# Patient Record
Sex: MALE | Race: WHITE | Employment: FULL TIME | URBAN - NONMETROPOLITAN AREA
[De-identification: names, ages, dates, MRNs, and addresses within clinical notes are randomized per-mention and may not be internally consistent; named-entity substitution may affect disease eponyms.]

---

## 2014-12-06 LAB
AVERAGE GLUCOSE: NORMAL
HBA1C MFR BLD: 5 %

## 2016-09-15 LAB
CHOLESTEROL, TOTAL: 284 MG/DL
CHOLESTEROL/HDL RATIO: 5.2
HDLC SERPL-MCNC: 55 MG/DL (ref 35–70)
LDL CHOLESTEROL CALCULATED: 156.2 MG/DL (ref 0–160)
TRIGL SERPL-MCNC: 364 MG/DL
VLDLC SERPL CALC-MCNC: 73 MG/DL

## 2017-02-20 ENCOUNTER — OFFICE VISIT (OUTPATIENT)
Dept: PRIMARY CARE CLINIC | Age: 47
End: 2017-02-20

## 2017-02-20 VITALS
WEIGHT: 195 LBS | HEART RATE: 71 BPM | DIASTOLIC BLOOD PRESSURE: 80 MMHG | TEMPERATURE: 96.2 F | RESPIRATION RATE: 16 BRPM | HEIGHT: 73 IN | SYSTOLIC BLOOD PRESSURE: 142 MMHG | OXYGEN SATURATION: 98 % | BODY MASS INDEX: 25.84 KG/M2

## 2017-02-20 DIAGNOSIS — M26.623 BILATERAL TEMPOROMANDIBULAR JOINT PAIN: Primary | ICD-10-CM

## 2017-02-20 PROCEDURE — 99213 OFFICE O/P EST LOW 20 MIN: CPT | Performed by: FAMILY MEDICINE

## 2017-02-20 ASSESSMENT — ENCOUNTER SYMPTOMS
GASTROINTESTINAL NEGATIVE: 1
RESPIRATORY NEGATIVE: 1
EYES NEGATIVE: 1
ALLERGIC/IMMUNOLOGIC NEGATIVE: 1

## 2017-09-20 LAB
CHOLESTEROL/HDL RATIO: 5.1 RATIO
CHOLESTEROL: 307 MG/DL
HDL, DIRECT: 60 MG/DL
LDL CHOLESTEROL CALCULATED: 188.4 MG/DL
SCREENING PSA: 0.82 NG/ML
TRIGL SERPL-MCNC: 293 MG/DL
VLDLC SERPL CALC-MCNC: 59 MG/DL

## 2017-09-21 ENCOUNTER — OFFICE VISIT (OUTPATIENT)
Dept: FAMILY MEDICINE CLINIC | Age: 47
End: 2017-09-21
Payer: COMMERCIAL

## 2017-09-21 VITALS
HEIGHT: 73 IN | DIASTOLIC BLOOD PRESSURE: 86 MMHG | BODY MASS INDEX: 25.45 KG/M2 | HEART RATE: 80 BPM | SYSTOLIC BLOOD PRESSURE: 120 MMHG | WEIGHT: 192 LBS

## 2017-09-21 VITALS
SYSTOLIC BLOOD PRESSURE: 122 MMHG | WEIGHT: 196 LBS | HEIGHT: 72 IN | BODY MASS INDEX: 26.55 KG/M2 | HEART RATE: 64 BPM | DIASTOLIC BLOOD PRESSURE: 84 MMHG

## 2017-09-21 DIAGNOSIS — Z80.42 FAMILY HISTORY OF PROSTATE CANCER IN FATHER: ICD-10-CM

## 2017-09-21 DIAGNOSIS — Z80.42 FAMILY HISTORY OF MALIGNANT NEOPLASM OF PROSTATE: ICD-10-CM

## 2017-09-21 DIAGNOSIS — E78.2 MIXED HYPERLIPIDEMIA: ICD-10-CM

## 2017-09-21 DIAGNOSIS — R12 HEARTBURN: ICD-10-CM

## 2017-09-21 DIAGNOSIS — Z00.00 EXAMINATION, MEDICAL, GENERAL: Primary | ICD-10-CM

## 2017-09-21 DIAGNOSIS — K21.9 GASTROESOPHAGEAL REFLUX DISEASE WITHOUT ESOPHAGITIS: ICD-10-CM

## 2017-09-21 PROBLEM — E78.5 HYPERLIPIDEMIA: Status: ACTIVE | Noted: 2017-09-21

## 2017-09-21 PROCEDURE — 99396 PREV VISIT EST AGE 40-64: CPT | Performed by: FAMILY MEDICINE

## 2017-09-21 RX ORDER — PREDNISONE 20 MG/1
20 TABLET ORAL DAILY
COMMUNITY
End: 2017-09-21 | Stop reason: ALTCHOICE

## 2017-09-21 RX ORDER — ATORVASTATIN CALCIUM 40 MG/1
40 TABLET, FILM COATED ORAL DAILY
Qty: 90 TABLET | Refills: 3 | Status: SHIPPED | OUTPATIENT
Start: 2017-09-21 | End: 2018-09-26 | Stop reason: SDUPTHER

## 2017-09-21 RX ORDER — ATORVASTATIN CALCIUM 40 MG/1
40 TABLET, FILM COATED ORAL DAILY
COMMUNITY
End: 2017-09-21 | Stop reason: SDUPTHER

## 2017-09-21 RX ORDER — IBUPROFEN 200 MG
200 TABLET ORAL EVERY 6 HOURS PRN
COMMUNITY
End: 2020-01-17 | Stop reason: ALTCHOICE

## 2017-09-21 ASSESSMENT — PATIENT HEALTH QUESTIONNAIRE - PHQ9
1. LITTLE INTEREST OR PLEASURE IN DOING THINGS: 0
SUM OF ALL RESPONSES TO PHQ QUESTIONS 1-9: 0
SUM OF ALL RESPONSES TO PHQ9 QUESTIONS 1 & 2: 0
2. FEELING DOWN, DEPRESSED OR HOPELESS: 0

## 2017-09-23 PROBLEM — K21.9 GASTROESOPHAGEAL REFLUX DISEASE WITHOUT ESOPHAGITIS: Status: ACTIVE | Noted: 2017-09-21

## 2017-09-23 ASSESSMENT — ENCOUNTER SYMPTOMS
SHORTNESS OF BREATH: 0
ABDOMINAL DISTENTION: 0
WHEEZING: 0
SORE THROAT: 0
ABDOMINAL PAIN: 0
CHEST TIGHTNESS: 0

## 2017-11-21 DIAGNOSIS — E78.2 MIXED HYPERLIPIDEMIA: ICD-10-CM

## 2017-11-21 LAB
A/G RATIO: 1.5 RATIO
ALBUMIN: 4.1 G/DL
ALK PHOSPHATASE: 91 UNITS/L
ALT SERPL-CCNC: 96 UNITS/L
AST SERPL-CCNC: 72 UNITS/L
BILIRUB SERPL-MCNC: 0.7 MG/DL
BILIRUBIN DIRECT: 0 MG/DL
CHOLESTEROL, TOTAL: 199 MG/DL
CHOLESTEROL/HDL RATIO: 4.1
CHOLESTEROL/HDL RATIO: 4.1 RATIO
CHOLESTEROL: 199 MG/DL
GLOBULIN: 2.8 G/DL
HDL, DIRECT: 49 MG/DL
HDLC SERPL-MCNC: 49 MG/DL (ref 35–70)
LDL CHOLESTEROL CALCULATED: 58.2 MG/DL
LDL CHOLESTEROL CALCULATED: 58.2 MG/DL (ref 0–160)
TOTAL PROTEIN: 6.9 G/DL
TRIGL SERPL-MCNC: 459 MG/DL
TRIGL SERPL-MCNC: 459 MG/DL
VLDLC SERPL CALC-MCNC: 92 MG/DL
VLDLC SERPL CALC-MCNC: 92 MG/DL

## 2017-11-27 DIAGNOSIS — E78.2 MIXED HYPERLIPIDEMIA: Primary | ICD-10-CM

## 2017-12-04 DIAGNOSIS — R74.01 ELEVATED TRANSAMINASE LEVEL: Primary | ICD-10-CM

## 2017-12-19 LAB
HAV IGM SER IA-ACNC: NORMAL
HEPATITIS B CORE IGM ANTIBODY: NORMAL
HEPATITIS B SURFACE ANTIGEN: NORMAL
HEPATITIS C IGG: NORMAL
Lab: NORMAL
SIGNAL/CUTOFF: NORMAL

## 2018-01-19 ENCOUNTER — TELEPHONE (OUTPATIENT)
Dept: FAMILY MEDICINE CLINIC | Age: 48
End: 2018-01-19

## 2018-06-27 ENCOUNTER — HOSPITAL ENCOUNTER (OUTPATIENT)
Dept: GENERAL RADIOLOGY | Age: 48
Discharge: HOME | End: 2018-06-29
Payer: COMMERCIAL

## 2018-06-27 ENCOUNTER — HOSPITAL ENCOUNTER (OUTPATIENT)
Age: 48
Discharge: HOME | End: 2018-06-29
Payer: COMMERCIAL

## 2018-06-27 ENCOUNTER — OFFICE VISIT (OUTPATIENT)
Dept: PRIMARY CARE CLINIC | Age: 48
End: 2018-06-27
Payer: COMMERCIAL

## 2018-06-27 VITALS
TEMPERATURE: 97.8 F | WEIGHT: 194 LBS | OXYGEN SATURATION: 98 % | SYSTOLIC BLOOD PRESSURE: 150 MMHG | RESPIRATION RATE: 16 BRPM | BODY MASS INDEX: 25.71 KG/M2 | HEIGHT: 73 IN | DIASTOLIC BLOOD PRESSURE: 90 MMHG | HEART RATE: 70 BPM

## 2018-06-27 DIAGNOSIS — M47.816 SPONDYLOSIS OF LUMBAR SPINE: ICD-10-CM

## 2018-06-27 DIAGNOSIS — M54.50 ACUTE LEFT-SIDED LOW BACK PAIN WITHOUT SCIATICA: ICD-10-CM

## 2018-06-27 DIAGNOSIS — M43.10 RETROLISTHESIS OF VERTEBRAE: Primary | ICD-10-CM

## 2018-06-27 PROCEDURE — 72100 X-RAY EXAM L-S SPINE 2/3 VWS: CPT

## 2018-06-27 PROCEDURE — 99213 OFFICE O/P EST LOW 20 MIN: CPT | Performed by: NURSE PRACTITIONER

## 2018-06-27 RX ORDER — PREDNISONE 20 MG/1
20 TABLET ORAL DAILY
Qty: 5 TABLET | Refills: 0 | Status: SHIPPED | OUTPATIENT
Start: 2018-06-27 | End: 2018-07-02

## 2018-06-27 RX ORDER — HYDROCODONE BITARTRATE AND ACETAMINOPHEN 5; 325 MG/1; MG/1
1 TABLET ORAL EVERY 6 HOURS PRN
Qty: 28 TABLET | Refills: 0 | Status: SHIPPED | OUTPATIENT
Start: 2018-06-27 | End: 2018-07-04

## 2018-06-27 ASSESSMENT — PATIENT HEALTH QUESTIONNAIRE - PHQ9
2. FEELING DOWN, DEPRESSED OR HOPELESS: 0
SUM OF ALL RESPONSES TO PHQ9 QUESTIONS 1 & 2: 0
1. LITTLE INTEREST OR PLEASURE IN DOING THINGS: 0
SUM OF ALL RESPONSES TO PHQ QUESTIONS 1-9: 0

## 2018-06-27 ASSESSMENT — ENCOUNTER SYMPTOMS
BACK PAIN: 1
RESPIRATORY NEGATIVE: 1
BOWEL INCONTINENCE: 0

## 2018-09-21 LAB
A/G RATIO: 1.5 RATIO
ALBUMIN: 4.3 G/DL
ALK PHOSPHATASE: 86 UNITS/L
ALT SERPL-CCNC: 61 UNITS/L
AST SERPL-CCNC: 37 UNITS/L
BILIRUB SERPL-MCNC: 0.7 MG/DL
BILIRUBIN DIRECT: 0 MG/DL
CHOLESTEROL/HDL RATIO: 4.5 RATIO
CHOLESTEROL: 229 MG/DL
GLOBULIN: 2.8 G/DL
HDL, DIRECT: 51 MG/DL
LDL CHOLESTEROL CALCULATED: 45.8 MG/DL
SCREENING PSA: 0.74 NG/ML
TOTAL PROTEIN: 7.1 G/DL
TRIGL SERPL-MCNC: 661 MG/DL
VLDLC SERPL CALC-MCNC: 132 MG/DL

## 2018-09-26 ENCOUNTER — OFFICE VISIT (OUTPATIENT)
Dept: FAMILY MEDICINE CLINIC | Age: 48
End: 2018-09-26
Payer: COMMERCIAL

## 2018-09-26 VITALS
DIASTOLIC BLOOD PRESSURE: 70 MMHG | HEIGHT: 73 IN | WEIGHT: 201 LBS | BODY MASS INDEX: 26.64 KG/M2 | SYSTOLIC BLOOD PRESSURE: 134 MMHG

## 2018-09-26 DIAGNOSIS — H10.9 CONJUNCTIVITIS OF LEFT EYE, UNSPECIFIED CONJUNCTIVITIS TYPE: ICD-10-CM

## 2018-09-26 DIAGNOSIS — Z80.42 FAMILY HISTORY OF MALIGNANT NEOPLASM OF PROSTATE: ICD-10-CM

## 2018-09-26 DIAGNOSIS — E78.1 HYPERTRIGLYCERIDEMIA: ICD-10-CM

## 2018-09-26 DIAGNOSIS — E78.2 MIXED HYPERLIPIDEMIA: Primary | ICD-10-CM

## 2018-09-26 DIAGNOSIS — K21.9 GASTROESOPHAGEAL REFLUX DISEASE WITHOUT ESOPHAGITIS: ICD-10-CM

## 2018-09-26 LAB — GLUCOSE BLD-MCNC: 98 MG/DL

## 2018-09-26 PROCEDURE — 82962 GLUCOSE BLOOD TEST: CPT | Performed by: FAMILY MEDICINE

## 2018-09-26 PROCEDURE — 99214 OFFICE O/P EST MOD 30 MIN: CPT | Performed by: FAMILY MEDICINE

## 2018-09-26 RX ORDER — FENOFIBRATE 145 MG/1
145 TABLET, COATED ORAL DAILY
Qty: 30 TABLET | Refills: 11 | Status: SHIPPED | OUTPATIENT
Start: 2018-09-26 | End: 2020-01-17 | Stop reason: ALTCHOICE

## 2018-09-26 RX ORDER — ATORVASTATIN CALCIUM 40 MG/1
40 TABLET, FILM COATED ORAL DAILY
Qty: 90 TABLET | Refills: 3 | Status: SHIPPED | OUTPATIENT
Start: 2018-09-26 | End: 2021-03-02 | Stop reason: SDUPTHER

## 2018-09-26 RX ORDER — KETOROLAC TROMETHAMINE 5 MG/ML
1 SOLUTION OPHTHALMIC 4 TIMES DAILY
Qty: 5 ML | Refills: 0 | Status: SHIPPED | OUTPATIENT
Start: 2018-09-26 | End: 2018-10-03

## 2018-09-26 ASSESSMENT — PATIENT HEALTH QUESTIONNAIRE - PHQ9
2. FEELING DOWN, DEPRESSED OR HOPELESS: 0
1. LITTLE INTEREST OR PLEASURE IN DOING THINGS: 0
SUM OF ALL RESPONSES TO PHQ9 QUESTIONS 1 & 2: 0
SUM OF ALL RESPONSES TO PHQ QUESTIONS 1-9: 0
SUM OF ALL RESPONSES TO PHQ QUESTIONS 1-9: 0

## 2018-09-27 NOTE — PROGRESS NOTES
[address] [address] [address]  Dept: [phone]  Dept Fax: [phone]    Chon Colunga is a 50 y.o. male who presents today for his medical conditions/complaints as noted below. Chon Colunga is c/o of [facility] (Here to review labs, no concerns other than left eye lid swelling for past 2 weeks, watery at first, no redness or puss noted)      HPI:     Ebenezer is here to review his lipids. We saw his cholesterol profile just recently and his cholesterol was elevated at 229. However, what was most remarkable was his triglycerides were 661. We called him to make an appointment. Since his last visit he is, in fact, taking atorvastatin 40 mg daily. He has been taking it all along. He also is taking 1000 mg of fish oil for his triglycerides. He is otherwise asymptomatic. His cholesterol has been better. In the past it has even been below 200 with the use of atorvastatin. When initiated his cholesterol was over 300. He does admit that he might overindulge in alcohol. He denies  polyuria, polyphagia or polydipsia. He does not have any stomach pain. Family history of prostate cancer - father  He has no symptoms. His routine PSA, which we do every year, was 0.74. GERD  Remains on omeprazole 20 mg daily. States he has absolutely no symptoms. No heartburn. No dysphagia. He has needed esophageal dilatation in the past    He also has had a puffy left eye for about a week. He does not wear contacts. His eye seems to be watering. It is not red. The upper lid seems droopy. The lower lid seems puffy. No congestion. No visual changes.        BP Readings from Last 3 Encounters:   09/26/18 134/70   06/27/18 (!) 150/90   09/21/17 120/86            (goal 120/80)    Past Medical History:   Diagnosis Date    Hyperlipidemia       Past Surgical History:   Procedure Laterality Date   Scottie Pacheco Dr Mayank Mckeon biopsy negative; dilatation performed    UPPER GASTROINTESTINAL ENDOSCOPY  10/2015    Dr Bety Daniel -negative    VASECTOMY  2004     Family History   Problem Relation Age of Onset    Cancer Father     Prostate Cancer Father     High Blood Pressure Mother      Social History   Substance Use Topics    Smoking status: Never Smoker    Smokeless tobacco: Never Used    Alcohol use Yes      Comment: Social        Current Outpatient Prescriptions   Medication Sig Dispense Refill    atorvastatin (LIPITOR) 40 MG tablet Take 1 tablet by mouth daily 90 tablet 3    fenofibrate (TRICOR) 145 MG tablet Take 1 tablet by mouth daily 30 tablet 11    ketorolac (ACULAR) 0.5 % ophthalmic solution Place 1 drop into the left eye 4 times daily for 7 days 5 mL 0    omeprazole (PRILOSEC) 20 MG capsule Take 20 mg by mouth daily      ibuprofen (ADVIL;MOTRIN) 200 MG tablet Take 200 mg by mouth every 6 hours as needed for Pain       No current facility-administered medications for this visit. No Known Allergies    Health Maintenance   Topic Date Due    HIV screen  06/30/1985    Flu vaccine (1) 09/01/2018    Lipid screen  09/21/2023    DTaP/Tdap/Td vaccine (2 - Td) 09/18/2025       Lab Results   Component Value Date    AST 37 09/21/2018    ALT 61 09/21/2018    LABA1C 5 12/06/2014    GLUCOSE 98 09/26/2018      Lab Results   Component Value Date    CHOL 229 09/21/2018    CHOL 199 11/21/2017    TRIG 661 09/21/2018    HDL 49 11/21/2017       Review of Systems:      Review of Systems   Constitutional: Negative for chills, diaphoresis and fever. HENT: Negative for sore throat. Eyes: Positive for discharge (Clear watery). Negative for photophobia, pain, redness and visual disturbance. Respiratory: Negative for chest tightness, shortness of breath and wheezing. Cardiovascular: Negative for chest pain, palpitations and leg swelling.    Gastrointestinal: Negative for abdominal distention and abdominal pain. Genitourinary: Negative for difficulty urinating, dysuria and hematuria. Hematological: Negative for adenopathy. Objective:     /70   Ht 6' 1\" (1.854 m)   Wt 201 lb (91.2 kg)   BMI 26.52 kg/m²     Physical Exam   Constitutional: He appears well-developed and well-nourished. No distress. HENT:   Head: Normocephalic and atraumatic. Right Ear: Tympanic membrane normal.   Left Ear: Tympanic membrane normal.   Nose: Nose normal.   Mouth/Throat: No posterior oropharyngeal edema or posterior oropharyngeal erythema. Eyes: Pupils are equal, round, and reactive to light. Conjunctivae and EOM are normal. Left eye exhibits discharge. Left eye exhibits no chemosis, no exudate and no hordeolum. No scleral icterus. Neck: Neck supple. Carotid bruit is not present. No thyromegaly present. Cardiovascular: Regular rhythm, S1 normal and S2 normal.    No murmur heard. Pulmonary/Chest: Breath sounds normal. He has no wheezes. He has no rhonchi. He has no rales. Abdominal: Soft. Bowel sounds are normal. There is no hepatosplenomegaly. There is no tenderness. Musculoskeletal: Normal range of motion. He exhibits no edema. Lymphadenopathy:     He has no cervical adenopathy. He has no axillary adenopathy. Skin: No rash noted. Vitals reviewed. Assessment:      Diagnosis Orders   1. Mixed hyperlipidemia  atorvastatin (LIPITOR) 40 MG tablet    Lipid Panel    AST    ALT   2. Family history of malignant neoplasm of prostate     3. Gastroesophageal reflux disease without esophagitis     4. Hypertriglyceridemia  POCT Glucose    fenofibrate (TRICOR) 145 MG tablet   5. Conjunctivitis of left eye, unspecified conjunctivitis type  ketorolac (ACULAR) 0.5 % ophthalmic solution        POC Testing Results (If Applicable):  Results for POC orders placed in visit on 09/26/18   POCT Glucose   Result Value Ref Range    Glucose 98 mg/dL       Plan:      At this time, a fingerstick glucose was obtained and came back within normal limits at 98. He will cut back on alcohol consumption. He will continue atorvastatin. We will add Tricor 145 mg daily. Repeat lipids in 3 months. We will prescribe Ketoralac eye drops 4 times a day to see if that can improve his symptoms. Recheck in 3 months. Orders Given:  Orders Placed This Encounter   Procedures    Lipid Panel     Standing Status:   Future     Standing Expiration Date:   9/26/2019     Order Specific Question:   Is Patient Fasting?/# of Hours     Answer:   Yes, 12 hours    AST     Standing Status:   Future     Standing Expiration Date:   9/26/2019    ALT     Standing Status:   Future     Standing Expiration Date:   9/26/2019    POCT Glucose       Prescriptions:    Orders Placed This Encounter   Medications    atorvastatin (LIPITOR) 40 MG tablet     Sig: Take 1 tablet by mouth daily     Dispense:  90 tablet     Refill:  3    fenofibrate (TRICOR) 145 MG tablet     Sig: Take 1 tablet by mouth daily     Dispense:  30 tablet     Refill:  11    ketorolac (ACULAR) 0.5 % ophthalmic solution     Sig: Place 1 drop into the left eye 4 times daily for 7 days     Dispense:  5 mL     Refill:  0        Return in about 3 months (around 12/26/2018).       David Cazares am scribing for Barrie Alejandro MD 9/28/2018 at 6:51 AM.

## 2018-09-28 ASSESSMENT — ENCOUNTER SYMPTOMS
EYE DISCHARGE: 1
ABDOMINAL DISTENTION: 0
EYE PAIN: 0
WHEEZING: 0
CHEST TIGHTNESS: 0
PHOTOPHOBIA: 0
EYE REDNESS: 0
SHORTNESS OF BREATH: 0
ABDOMINAL PAIN: 0
SORE THROAT: 0

## 2018-12-21 LAB
ALT SERPL-CCNC: 63 UNITS/L
AST SERPL-CCNC: 42 UNITS/L
CHOLESTEROL/HDL RATIO: 3 RATIO
CHOLESTEROL: 190 MG/DL
HDL, DIRECT: 63 MG/DL
LDL CHOLESTEROL CALCULATED: 106.4 MG/DL
TRIGL SERPL-MCNC: 103 MG/DL
VLDLC SERPL CALC-MCNC: 21 MG/DL

## 2020-01-17 ENCOUNTER — OFFICE VISIT (OUTPATIENT)
Dept: FAMILY MEDICINE CLINIC | Age: 50
End: 2020-01-17
Payer: COMMERCIAL

## 2020-01-17 VITALS
HEIGHT: 75 IN | BODY MASS INDEX: 26.51 KG/M2 | SYSTOLIC BLOOD PRESSURE: 134 MMHG | OXYGEN SATURATION: 98 % | DIASTOLIC BLOOD PRESSURE: 88 MMHG | TEMPERATURE: 98 F | HEART RATE: 74 BPM | WEIGHT: 213.2 LBS

## 2020-01-17 PROCEDURE — 99213 OFFICE O/P EST LOW 20 MIN: CPT | Performed by: NURSE PRACTITIONER

## 2020-01-17 RX ORDER — PREDNISONE 20 MG/1
20 TABLET ORAL 2 TIMES DAILY
Qty: 10 TABLET | Refills: 0 | Status: SHIPPED | OUTPATIENT
Start: 2020-01-17 | End: 2020-01-22

## 2020-01-17 RX ORDER — CYCLOBENZAPRINE HCL 10 MG
10 TABLET ORAL 3 TIMES DAILY PRN
Qty: 21 TABLET | Refills: 0 | Status: SHIPPED | OUTPATIENT
Start: 2020-01-17 | End: 2020-01-27

## 2020-01-17 ASSESSMENT — ENCOUNTER SYMPTOMS
BOWEL INCONTINENCE: 0
BACK PAIN: 1

## 2020-01-17 ASSESSMENT — PATIENT HEALTH QUESTIONNAIRE - PHQ9
1. LITTLE INTEREST OR PLEASURE IN DOING THINGS: 0
SUM OF ALL RESPONSES TO PHQ QUESTIONS 1-9: 0
2. FEELING DOWN, DEPRESSED OR HOPELESS: 0
SUM OF ALL RESPONSES TO PHQ9 QUESTIONS 1 & 2: 0
SUM OF ALL RESPONSES TO PHQ QUESTIONS 1-9: 0

## 2020-01-17 NOTE — PROGRESS NOTES
MG tablet     No results found for this visit on 01/17/20. Plan:       Aleve 2.5 tabs every 12 hours with food. Flexeril every 8. Do not operate heavy machinery or drive while taking this medication. If aleve does not work stop it and switch to prednisone. Follow up with primary care provider in 1 to 2 days if needed. Patient Instructions     Aleve 2.5 tabs every 12 hours with food. Flexeril every 8. Do not operate heavy machinery or drive while taking this medication. If aleve does not work stop it and switch to prednisone. Follow up with primary care provider in 1 to 2 days if needed. Patient Education        Strain or Sprain: Care Instructions  Your Care Instructions    A strain happens when you overstretch, or pull, a muscle. A sprain occurs when you stretch or tear a ligament, the tough tissue that connects one bone to another. These problems can happen when you exercise or lift something or when you are in an accident. Rest and other home care can help strains and sprains heal.  The doctor has checked you carefully, but problems can develop later. If you notice any problems or new symptoms,  get medical treatment right away. Follow-up care is a key part of your treatment and safety. Be sure to make and go to all appointments, and call your doctor if you are having problems. It's also a good idea to know your test results and keep a list of the medicines you take. How can you care for yourself at home? · If your doctor gave you a sling, splint, brace, or immobilizer, use it exactly as directed. · Rest the strained or sprained area, and follow your doctor's advice about when you can be active again. · Put ice or a cold pack on the sore area for 10 to 20 minutes at a time to stop swelling. Try this every 1 to 2 hours for 3 days (when you are awake) or until the swelling goes down. Put a thin cloth between the ice pack and your skin. Keep your splint or brace dry.   · Prop up a your hips. Sit straight, and drive with both hands on the steering wheel. Your arms should be in a slightly bent position. · Try a kneeling chair, which helps tilt your hips forward. This takes pressure off your lower back. · Try sitting on an exercise ball. It can rock from side to side, which helps keep your back loose. Lift properly  · Squat down, bending at the hips and knees only. If you need to, put one knee to the floor and extend your other knee in front of you, bent at a right angle (half kneeling). · Press your chest straight forward. This helps keep your upper back straight while keeping a slight arch in your low back. · Hold the load as close to your body as possible, at the level of your navel. · Use your feet to change direction, taking small steps. · Lead with your hips as you change direction. Keep your shoulders in line with your hips as you move. Do not twist your body. · Set down your load carefully, squatting with your knees and hips only. When should you call for help? Watch closely for changes in your health, and be sure to contact your doctor if you have any problems. Where can you learn more? Go to https://[employer].peMyLuvs.Housebites. org and sign in to your [employer] account. Enter S810 in the [employer] box to learn more about \"Back Care and Preventing Injuries: Care Instructions. \"     If you do not have an account, please click on the \"Sign Up Now\" link. Current as of: June 26, 2019  Content Version: 12.3  © 3733-5079 Healthwise, Incorporated. Care instructions adapted under license by [facility] [employer] [facility] ([facility]). If you have questions about a medical condition or this instruction, always ask your healthcare professional. [address] [address] any warranty or liability for your use of this information. Patient/Caregiver instructed on use, benefit, and side effects of prescribed medications. All patient/parent/caregiver questions answered. Patient/parent/caregiver voiced understanding. Reviewed health maintenance. Instructed to continue current medications, diet and exercise. Patient agreed with treatment plan. Follow up as directed.            Electronically signed by SUNITHA Lynn CNP on1/17/2020

## 2020-01-17 NOTE — PATIENT INSTRUCTIONS
Aleve 2.5 tabs every 12 hours with food. Flexeril every 8. Do not operate heavy machinery or drive while taking this medication. If aleve does not work stop it and switch to prednisone. Follow up with primary care provider in 1 to 2 days if needed. Patient Education        Strain or Sprain: Care Instructions  Your Care Instructions    A strain happens when you overstretch, or pull, a muscle. A sprain occurs when you stretch or tear a ligament, the tough tissue that connects one bone to another. These problems can happen when you exercise or lift something or when you are in an accident. Rest and other home care can help strains and sprains heal.  The doctor has checked you carefully, but problems can develop later. If you notice any problems or new symptoms,  get medical treatment right away. Follow-up care is a key part of your treatment and safety. Be sure to make and go to all appointments, and call your doctor if you are having problems. It's also a good idea to know your test results and keep a list of the medicines you take. How can you care for yourself at home? · If your doctor gave you a sling, splint, brace, or immobilizer, use it exactly as directed. · Rest the strained or sprained area, and follow your doctor's advice about when you can be active again. · Put ice or a cold pack on the sore area for 10 to 20 minutes at a time to stop swelling. Try this every 1 to 2 hours for 3 days (when you are awake) or until the swelling goes down. Put a thin cloth between the ice pack and your skin. Keep your splint or brace dry. · Prop up a sore arm or leg on a pillow when you ice it or anytime you sit or lie down. Try to keep it higher than the level of your heart. This will help reduce swelling. · Take pain medicines exactly as directed. ? If the doctor gave you a prescription medicine for pain, take it as prescribed.   ? If you are not taking a prescription pain medicine, ask your doctor if you can take an over-the-counter medicine. · Do exercises as directed by your doctor or physical therapist.  · Return to your usual level of activity slowly. · Do not do anything that makes the pain worse. When should you call for help? Call your doctor now or seek immediate medical care if:    · You have severe or increasing pain.     · You have tingling, weakness, or numbness in the area.     · The area turns cold or changes color.     · Your cast or splint feels too tight.     · You have symptoms of a blood clot, such as:  ? Pain in your calf, back of the knee, thigh, or groin. ? Redness and swelling in your leg or groin.     · You cannot move the strained part of your body.    Watch closely for changes in your health, and be sure to contact your doctor if:    · You do not get better as expected. Where can you learn more? Go to https://Salsifypechadeweb.Teqcycle. org and sign in to your SinglePlatform account. Enter U321 in the [employer] box to learn more about \"Strain or Sprain: Care Instructions. \"     If you do not have an account, please click on the \"Sign Up Now\" link. Current as of: June 26, 2019  Content Version: 12.3  © 7252-5358 Healthwise, Incorporated. Care instructions adapted under license by [email or web address] ([facility]). If you have questions about a medical condition or this instruction, always ask your healthcare professional. [address] [address] any warranty or liability for your use of this information. Patient Education        Back Care and Preventing Injuries: Care Instructions  Your Care Instructions    You can hurt your back doing many everyday activities: lifting a heavy box, bending down to garden, exercising at the gym, and even getting out of bed. But you can keep your back strong and healthy by doing some exercises. You also can follow a few tips for sitting, sleeping, and lifting to avoid hurting your back again.   Talk to your doctor before you start an exercise toward your ribs. You should feel like your back is pressing to the floor and your hips and pelvis are slightly lifting off the floor. Hold for 6 seconds while breathing smoothly. · Keep your core muscles strong. The muscles of your back, belly (abdomen), and buttocks support your spine. ? Pull in your belly, and imagine pulling your navel toward your spine. Hold this for 6 seconds, then relax. Remember to keep breathing normally as you tense your muscles. ? Do curl-ups. Always do them with your knees bent. Keep your low back on the floor, and curl your shoulders toward your knees using a smooth, slow motion. Keep your arms folded across your chest. If this bothers your neck, try putting your hands behind your neck (not your head), with your elbows spread apart. ? Lie on your back with your knees bent and your feet flat on the floor. Tighten your belly muscles, and then push with your feet and raise your buttocks up a few inches. Hold this position 6 seconds as you continue to breathe normally, then lower yourself slowly to the floor. Repeat 8 to 12 times. ? If you like group exercise, try Pilates or yoga. These classes have poses that strengthen the core muscles. Protect your back when you sit  · Place a small pillow, a rolled-up towel, or a lumbar roll in the curve of your back if you need extra support. · Sit in a chair that is low enough to let you place both feet flat on the floor with both knees nearly level with your hips. If your chair or desk is too high, use a foot rest to raise your knees. · When driving, keep your knees nearly level with your hips. Sit straight, and drive with both hands on the steering wheel. Your arms should be in a slightly bent position. · Try a kneeling chair, which helps tilt your hips forward. This takes pressure off your lower back. · Try sitting on an exercise ball. It can rock from side to side, which helps keep your back loose.   Lift properly  · Squat down, bending at the hips and knees only. If you need to, put one knee to the floor and extend your other knee in front of you, bent at a right angle (half kneeling). · Press your chest straight forward. This helps keep your upper back straight while keeping a slight arch in your low back. · Hold the load as close to your body as possible, at the level of your navel. · Use your feet to change direction, taking small steps. · Lead with your hips as you change direction. Keep your shoulders in line with your hips as you move. Do not twist your body. · Set down your load carefully, squatting with your knees and hips only. When should you call for help? Watch closely for changes in your health, and be sure to contact your doctor if you have any problems. Where can you learn more? Go to https://XebiaLabsklauseb.Access Point. org and sign in to your [employer] account. Enter S810 in the [employer] box to learn more about \"Back Care and Preventing Injuries: Care Instructions. \"     If you do not have an account, please click on the \"Sign Up Now\" link. Current as of: June 26, 2019  Content Version: 12.3  © 4581-0519 Healthwise, Incorporated. Care instructions adapted under license by [facility] ([facility]). If you have questions about a medical condition or this instruction, always ask your healthcare professional. [address] any warranty or liability for your use of this information.

## 2021-03-02 ENCOUNTER — OFFICE VISIT (OUTPATIENT)
Dept: FAMILY MEDICINE CLINIC | Age: 51
End: 2021-03-02
Payer: COMMERCIAL

## 2021-03-02 ENCOUNTER — HOSPITAL ENCOUNTER (OUTPATIENT)
Age: 51
Setting detail: SPECIMEN
Discharge: HOME | End: 2021-03-02
Payer: COMMERCIAL

## 2021-03-02 VITALS
DIASTOLIC BLOOD PRESSURE: 104 MMHG | BODY MASS INDEX: 27.18 KG/M2 | HEART RATE: 84 BPM | HEIGHT: 75 IN | SYSTOLIC BLOOD PRESSURE: 154 MMHG | OXYGEN SATURATION: 96 % | WEIGHT: 218.6 LBS

## 2021-03-02 DIAGNOSIS — K21.9 GASTROESOPHAGEAL REFLUX DISEASE WITHOUT ESOPHAGITIS: ICD-10-CM

## 2021-03-02 DIAGNOSIS — Z80.42 FAMILY HISTORY OF MALIGNANT NEOPLASM OF PROSTATE: ICD-10-CM

## 2021-03-02 DIAGNOSIS — E78.2 MIXED HYPERLIPIDEMIA: ICD-10-CM

## 2021-03-02 DIAGNOSIS — K64.4 EXTERNAL HEMORRHOID: ICD-10-CM

## 2021-03-02 DIAGNOSIS — Z76.89 ENCOUNTER TO ESTABLISH CARE: Primary | ICD-10-CM

## 2021-03-02 DIAGNOSIS — R03.0 ELEVATED BLOOD PRESSURE READING: ICD-10-CM

## 2021-03-02 LAB
ABSOLUTE EOS #: 0.12 K/UL (ref 0–0.44)
ABSOLUTE IMMATURE GRANULOCYTE: <0.03 K/UL (ref 0–0.3)
ABSOLUTE LYMPH #: 1.53 K/UL (ref 1.1–3.7)
ABSOLUTE MONO #: 0.77 K/UL (ref 0.1–1.2)
ALBUMIN SERPL-MCNC: 4.5 G/DL (ref 3.5–5.2)
ALBUMIN/GLOBULIN RATIO: 1.7 (ref 1–2.5)
ALP BLD-CCNC: 84 U/L (ref 40–129)
ALT SERPL-CCNC: 60 U/L (ref 5–41)
ANION GAP SERPL CALCULATED.3IONS-SCNC: 11 MMOL/L (ref 9–17)
AST SERPL-CCNC: 35 U/L
BASOPHILS # BLD: 1 % (ref 0–2)
BASOPHILS ABSOLUTE: 0.04 K/UL (ref 0–0.2)
BILIRUB SERPL-MCNC: 0.61 MG/DL (ref 0.3–1.2)
BUN BLDV-MCNC: 10 MG/DL (ref 6–20)
BUN/CREAT BLD: 10 (ref 9–20)
CALCIUM SERPL-MCNC: 9.9 MG/DL (ref 8.6–10.4)
CHLORIDE BLD-SCNC: 103 MMOL/L (ref 98–107)
CO2: 24 MMOL/L (ref 20–31)
CREAT SERPL-MCNC: 0.98 MG/DL (ref 0.7–1.2)
DIFFERENTIAL TYPE: ABNORMAL
EOSINOPHILS RELATIVE PERCENT: 2 % (ref 1–4)
GFR AFRICAN AMERICAN: >60 ML/MIN
GFR NON-AFRICAN AMERICAN: >60 ML/MIN
GFR SERPL CREATININE-BSD FRML MDRD: ABNORMAL ML/MIN/{1.73_M2}
GFR SERPL CREATININE-BSD FRML MDRD: ABNORMAL ML/MIN/{1.73_M2}
GLUCOSE BLD-MCNC: 87 MG/DL (ref 70–99)
HCT VFR BLD CALC: 47.4 % (ref 40.7–50.3)
HEMOGLOBIN: 16 G/DL (ref 13–17)
IMMATURE GRANULOCYTES: 0 %
LYMPHOCYTES # BLD: 23 % (ref 24–43)
MCH RBC QN AUTO: 31.5 PG (ref 25.2–33.5)
MCHC RBC AUTO-ENTMCNC: 33.8 G/DL (ref 25.2–33.5)
MCV RBC AUTO: 93.3 FL (ref 82.6–102.9)
MONOCYTES # BLD: 11 % (ref 3–12)
NRBC AUTOMATED: 0 PER 100 WBC
PDW BLD-RTO: 12 % (ref 11.8–14.4)
PLATELET # BLD: 149 K/UL (ref 138–453)
PLATELET ESTIMATE: ABNORMAL
PMV BLD AUTO: 11.3 FL (ref 8.1–13.5)
POTASSIUM SERPL-SCNC: 4.1 MMOL/L (ref 3.7–5.3)
RBC # BLD: 5.08 M/UL (ref 4.21–5.77)
RBC # BLD: ABNORMAL 10*6/UL
SEG NEUTROPHILS: 63 % (ref 36–65)
SEGMENTED NEUTROPHILS ABSOLUTE COUNT: 4.3 K/UL (ref 1.5–8.1)
SODIUM BLD-SCNC: 138 MMOL/L (ref 135–144)
TOTAL PROTEIN: 7.1 G/DL (ref 6.4–8.3)
WBC # BLD: 6.8 K/UL (ref 3.5–11.3)
WBC # BLD: ABNORMAL 10*3/UL

## 2021-03-02 PROCEDURE — G0103 PSA SCREENING: HCPCS

## 2021-03-02 PROCEDURE — 80061 LIPID PANEL: CPT

## 2021-03-02 PROCEDURE — 80053 COMPREHEN METABOLIC PANEL: CPT

## 2021-03-02 PROCEDURE — 99214 OFFICE O/P EST MOD 30 MIN: CPT | Performed by: NURSE PRACTITIONER

## 2021-03-02 PROCEDURE — 85025 COMPLETE CBC W/AUTO DIFF WBC: CPT

## 2021-03-02 PROCEDURE — 36415 COLL VENOUS BLD VENIPUNCTURE: CPT

## 2021-03-02 RX ORDER — ATORVASTATIN CALCIUM 40 MG/1
40 TABLET, FILM COATED ORAL NIGHTLY
Qty: 90 TABLET | Refills: 2 | Status: SHIPPED | OUTPATIENT
Start: 2021-03-02 | End: 2021-05-03 | Stop reason: SDUPTHER

## 2021-03-02 SDOH — ECONOMIC STABILITY: FOOD INSECURITY: WITHIN THE PAST 12 MONTHS, YOU WORRIED THAT YOUR FOOD WOULD RUN OUT BEFORE YOU GOT MONEY TO BUY MORE.: NEVER TRUE

## 2021-03-02 SDOH — ECONOMIC STABILITY: TRANSPORTATION INSECURITY
IN THE PAST 12 MONTHS, HAS LACK OF TRANSPORTATION KEPT YOU FROM MEETINGS, WORK, OR FROM GETTING THINGS NEEDED FOR DAILY LIVING?: NO

## 2021-03-02 ASSESSMENT — PATIENT HEALTH QUESTIONNAIRE - PHQ9
SUM OF ALL RESPONSES TO PHQ QUESTIONS 1-9: 0
SUM OF ALL RESPONSES TO PHQ QUESTIONS 1-9: 0

## 2021-03-02 ASSESSMENT — ENCOUNTER SYMPTOMS
COUGH: 0
CHOKING: 0
CONSTIPATION: 0
BELCHING: 0
ABDOMINAL PAIN: 0
SHORTNESS OF BREATH: 0
HEARTBURN: 0
EYES NEGATIVE: 1
GLOBUS SENSATION: 0
WHEEZING: 0
RECTAL PAIN: 1
SORE THROAT: 0
HOARSE VOICE: 0
DIARRHEA: 0

## 2021-03-02 NOTE — PATIENT INSTRUCTIONS
Patient Education   Patient Education        Low Sodium Diet (2,000 Milligram): Care Instructions  Your Care Instructions     Too much sodium causes your body to hold on to extra water. This can raise your blood pressure and force your heart and kidneys to work harder. In very serious cases, this could cause you to be put in the hospital. It might even be life-threatening. By limiting sodium, you will feel better and lower your risk of serious problems. The most common source of sodium is salt. People get most of the salt in their diet from canned, prepared, and packaged foods. Fast food and restaurant meals also are very high in sodium. Your doctor will probably limit your sodium to less than 2,000 milligrams (mg) a day. This limit counts all the sodium in prepared and packaged foods and any salt you add to your food. Follow-up care is a key part of your treatment and safety. Be sure to make and go to all appointments, and call your doctor if you are having problems. It's also a good idea to know your test results and keep a list of the medicines you take. How can you care for yourself at home? Read food labels  · Read labels on cans and food packages. The labels tell you how much sodium is in each serving. Make sure that you look at the serving size. If you eat more than the serving size, you have eaten more sodium. · Food labels also tell you the Percent Daily Value for sodium. Choose products with low Percent Daily Values for sodium. · Be aware that sodium can come in forms other than salt, including monosodium glutamate (MSG), sodium citrate, and sodium bicarbonate (baking soda). MSG is often added to Asian food. When you eat out, you can sometimes ask for food without MSG or added salt.   Buy low-sodium foods · Buy foods that are labeled \"unsalted\" (no salt added), \"sodium-free\" (less than 5 mg of sodium per serving), or \"low-sodium\" (less than 140 mg of sodium per serving). Foods labeled \"reduced-sodium\" and \"light sodium\" may still have too much sodium. Be sure to read the label to see how much sodium you are getting. · Buy fresh vegetables, or frozen vegetables without added sauces. Buy low-sodium versions of canned vegetables, soups, and other canned goods. Prepare low-sodium meals  · Cut back on the amount of salt you use in cooking. This will help you adjust to the taste. Do not add salt after cooking. One teaspoon of salt has about 2,300 mg of sodium. · Take the salt shaker off the table. · Flavor your food with garlic, lemon juice, onion, vinegar, herbs, and spices. Do not use soy sauce, lite soy sauce, steak sauce, onion salt, garlic salt, celery salt, mustard, or ketchup on your food. · Use low-sodium salad dressings, sauces, and ketchup. Or make your own salad dressings and sauces without adding salt. · Use less salt (or none) when recipes call for it. You can often use half the salt a recipe calls for without losing flavor. Other foods such as rice, pasta, and grains do not need added salt. · Rinse canned vegetables, and cook them in fresh water. This removes somebut not allof the salt. · Avoid water that is naturally high in sodium or that has been treated with water softeners, which add sodium. Call your local water company to find out the sodium content of your water supply. If you buy bottled water, read the label and choose a sodium-free brand. Avoid high-sodium foods  · Avoid eating:  ? Smoked, cured, salted, and canned meat, fish, and poultry. ? Ham, pisano, hot dogs, and luncheon meats. ? Regular, hard, and processed cheese and regular peanut butter. ? Crackers with salted tops, and other salted snack foods such as pretzels, chips, and salted popcorn. ? Frozen prepared meals, unless labeled low-sodium. ? Canned and dried soups, broths, and bouillon, unless labeled sodium-free or low-sodium. ? Canned vegetables, unless labeled sodium-free or low-sodium. ? [place] fries, pizza, tacos, and other fast foods. ? Pickles, olives, ketchup, and other condiments, especially soy sauce, unless labeled sodium-free or low-sodium. Where can you learn more? Go to [email or web address] org and sign in to your [employer] account. Enter Q068 in the [facility] box to learn more about \"Low Sodium Diet (2,000 Milligram): Care Instructions. \"     If you do not have an account, please click on the \"Sign Up Now\" link. Current as of: August 22, 2019               Content Version: 12.6  © 4465-1760 [employer]. Care instructions adapted under license by [facility] ([facility]). If you have questions about a medical condition or this instruction, always ask your healthcare professional. [place] [address] any warranty or liability for your use of this information. Hemorrhoids: Care Instructions  Your Care Instructions     Hemorrhoids are enlarged veins that develop in the anal canal. Bleeding during bowel movements, itching, swelling, and rectal pain are the most common symptoms. They can be uncomfortable at times, but hemorrhoids rarely are a serious problem. You can treat most hemorrhoids with simple changes to your diet and bowel habits. These changes include eating more fiber and not straining to pass stools. Most hemorrhoids do not need surgery or other treatment unless they are very large and painful or bleed a lot. Follow-up care is a key part of your treatment and safety. Be sure to make and go to all appointments, and call your doctor if you are having problems. It's also a good idea to know your test results and keep a list of the medicines you take. How can you care for yourself at home? · Sit in a few inches of warm water (sitz bath) 3 times a day and after bowel movements. The warm water helps with pain and itching. · Put ice on your anal area several times a day for 10 minutes at a time. Put a thin cloth between the ice and your skin. Follow this by placing a warm, wet towel on the area for another 10 to 20 minutes. · Take pain medicines exactly as directed. ? If the doctor gave you a prescription medicine for pain, take it as prescribed. ? If you are not taking a prescription pain medicine, ask your doctor if you can take an over-the-counter medicine. · Keep the anal area clean, but be gentle. Use water and a fragrance-free soap, such as [place], or use baby wipes or medicated pads, such as Tucks. · Wear cotton underwear and loose clothing to decrease moisture in the anal area. · Eat more fiber. Include foods such as whole-grain breads and cereals, raw vegetables, raw and dried fruits, and beans. · Drink plenty of fluids, enough so that your urine is light yellow or clear like water. If you have kidney, heart, or liver disease and have to limit fluids, talk with your doctor before you increase the amount of fluids you drink. · Use a stool softener that contains bran or psyllium. You can save money by buying bran or psyllium (available in bulk at most health food stores) and sprinkling it on foods or stirring it into fruit juice. Or you can use a product such as Metamucil or Hydrocil. · Practice healthy bowel habits. ? Go to the bathroom as soon as you have the urge. ? Avoid straining to pass stools. Relax and give yourself time to let things happen naturally. ? Do not hold your breath while passing stools. ? Do not read while sitting on the toilet. Get off the toilet as soon as you have finished. · Take your medicines exactly as prescribed. Call your doctor if you think you are having a problem with your medicine. When should you call for help?

## 2021-03-02 NOTE — PROGRESS NOTES
[address] [facility]  [address]  Dept: [phone]  Dept Fax: [phone]    Chief Complaint   Patient presents with   Nina Etienne Doctor     former dr Jesse Yo pt, would like to have complete blood work ordered    Gastroesophageal Reflux     denies nausea vomiting heartburn    Hyperlipidemia     denies dizziness sob chest pains    Hemorrhoids     says 8 months ago had a hemorrhoid and that resolved but can still feel something      HPI:   Patient presents today to establish care. He previously followed with Dr. Kenyetta Mccoy who retired 4/2020. He has a past medical history of hyperlipidemia, GERD, family history of prostate cancer in his dad. He has not been taking the Lipitor for quite some time. He continues to take omeprazole 20 mg in the morning before breakfast. He is also concerned for a hemorrhoid he has had for a few weeks. He has tried otc creams without much relief. He denies bleeding, constipation, straining with bowel movements. Hyperlipidemia  This is a chronic problem. The current episode started more than 1 year ago. He has no history of chronic renal disease, diabetes, hypothyroidism, liver disease, obesity or nephrotic syndrome. There are no known factors aggravating his hyperlipidemia. Pertinent negatives include no chest pain, focal sensory loss, focal weakness, leg pain, myalgias or shortness of breath. Current antihyperlipidemic treatment includes statins (Lipitor prescribed, but patient has not been taking the medication. ). Compliance problems include adherence to exercise and adherence to diet (discontinued medication). Risk factors for coronary artery disease include male sex and dyslipidemia.    Gastroesophageal Reflux He reports no abdominal pain, no belching, no chest pain, no choking, no coughing, no dysphagia, no globus sensation, no heartburn, no hoarse voice, no sore throat or no wheezing. This is a chronic problem. The current episode started more than 1 year ago. The problem occurs rarely. The symptoms are aggravated by certain foods. Pertinent negatives include no anemia, fatigue, melena, muscle weakness, orthopnea or weight loss. There are no known risk factors. He has tried a PPI for the symptoms. The treatment provided significant relief. The 10-year ASCVD risk score (Domenico Rivera, et al., 2013) is: 3.5%    Values used to calculate the score:      Age: 48 years      Sex: Male      Is Non- : No      Diabetic: No      Tobacco smoker: No      Systolic Blood Pressure: 711 mmHg      Is BP treated: No      HDL Cholesterol: 63 mg/dL      Total Cholesterol: 190 mg/dL    BP Readings from Last 3 Encounters:   03/02/21 (!) 154/104   01/17/20 134/88   09/26/18 134/70      150/100    Pulse Readings from Last 3 Encounters:   03/02/21 84   01/17/20 74   06/27/18 70        Wt Readings from Last 3 Encounters:   03/02/21 218 lb 9.6 oz (99.2 kg)   01/17/20 213 lb 3.2 oz (96.7 kg)   09/26/18 201 lb (91.2 kg)        Current Outpatient Medications   Medication Sig Dispense Refill    atorvastatin (LIPITOR) 40 MG tablet Take 1 tablet by mouth nightly 90 tablet 2    omeprazole (PRILOSEC) 20 MG capsule Take 20 mg by mouth daily       No current facility-administered medications for this visit.       Past Medical History:   Diagnosis Date    Gastroesophageal reflux disease without esophagitis     Hyperlipidemia      Past Surgical History:   Procedure Laterality Date    UPPER GASTROINTESTINAL ENDOSCOPY  1998    Dr Deb Teresa March biopsy negative; dilatation performed    UPPER GASTROINTESTINAL ENDOSCOPY  10/2015    Dr Robledo Goodness -negative    VASECTOMY  2004 With regard to his health habits, he eats 3 meals and 2 snacks per day. He does exercise regularly. He does not take over the counter vitamins. He wears seatbelts while riding a car. He does not text or talk on the phone while driving. He performs all of her ADL's without problem. He is independent, he cooks,drives, bathes, and gets dressed without assistance. He is happy with his life. Health Maintenance   Topic Date Due    Hepatitis C screen  Never done    HIV screen  Never done    Lipid screen  12/21/2019    Shingles Vaccine (1 of 2) Never done    Colon cancer screen colonoscopy  Never done    Flu vaccine (1) Never done    DTaP/Tdap/Td vaccine (2 - Td) 09/18/2025    Hepatitis A vaccine  Aged Out    Hepatitis B vaccine  Aged Out    Hib vaccine  Aged Out    Meningococcal (ACWY) vaccine  Aged Out    Pneumococcal 0-64 years Vaccine  Aged Out       Subjective:     Review of Systems   Constitutional: Negative for chills, fatigue, fever and weight loss. HENT: Negative. Negative for hoarse voice and sore throat. Eyes: Negative. Respiratory: Negative for cough, choking, shortness of breath and wheezing. Cardiovascular: Negative for chest pain, palpitations and leg swelling. Gastrointestinal: Positive for rectal pain (Hemorrhoid). Negative for abdominal pain, constipation, diarrhea, dysphagia, heartburn and melena. Endocrine: Negative for cold intolerance, heat intolerance, polydipsia, polyphagia and polyuria. Genitourinary: Negative. Musculoskeletal: Negative for arthralgias, myalgias and muscle weakness. Skin: Negative. Allergic/Immunologic: Negative for environmental allergies and food allergies. Neurological: Negative for dizziness, focal weakness, weakness and headaches. Psychiatric/Behavioral: Negative for dysphoric mood and sleep disturbance. The patient is not nervous/anxious.         Objective:     Vitals:    03/02/21 1241 03/02/21 1250   BP: (!) 164/118 (!) 154/104 Pulse: 84    SpO2: 96%    Weight: 218 lb 9.6 oz (99.2 kg)    Height: 6' 2.75\" (1.899 m)         Estimated body mass index is 27.51 kg/m² as calculated from the following:    Height as of this encounter: 6' 2.75\" (1.899 m). Weight as of this encounter: 218 lb 9.6 oz (99.2 kg). Physical Exam  Constitutional:       Appearance: Normal appearance. He is well-developed and well-groomed. HENT:      Head: Normocephalic. Nose: Nose normal.      Mouth/Throat:      Mouth: Mucous membranes are moist.   Eyes:      Conjunctiva/sclera: Conjunctivae normal.   Neck:      Musculoskeletal: Neck supple. Thyroid: No thyromegaly. Vascular: No carotid bruit. Cardiovascular:      Rate and Rhythm: Normal rate and regular rhythm. Heart sounds: Normal heart sounds. Pulmonary:      Effort: Pulmonary effort is normal.      Breath sounds: Normal breath sounds. No wheezing. Abdominal:      General: Bowel sounds are normal.      Palpations: Abdomen is soft. Tenderness: There is no abdominal tenderness. Genitourinary:     Rectum: External hemorrhoid (small without redness, swelling, bleeding) present. Musculoskeletal:      Right lower leg: No edema. Left lower leg: No edema. Lymphadenopathy:      Cervical: No cervical adenopathy. Skin:     Capillary Refill: Capillary refill takes less than 2 seconds. Neurological:      Mental Status: He is alert and oriented to person, place, and time. Psychiatric:         Mood and Affect: Mood normal.         Behavior: Behavior is cooperative. PHQ Scores 3/2/2021 1/17/2020 9/26/2018 6/27/2018 9/21/2017   PHQ2 Score 0 0 0 0 0   PHQ9 Score 0 0 0 0 0     Interpretation of Total Score Depression Severity: 1-4 = Minimal depression, 5-9 = Mild depression, 10-14 = Moderate depression, 15-19 = Moderately severe depression, 20-27 = Severe depression     Assessment:     1. Encounter to establish care    2.  Elevated blood pressure reading 3. Mixed hyperlipidemia    4. External hemorrhoid    5. Gastroesophageal reflux disease without esophagitis    6. Family history of malignant neoplasm of prostate        Plan:     Orders Placed This Encounter   Medications    atorvastatin (LIPITOR) 40 MG tablet     Sig: Take 1 tablet by mouth nightly     Dispense:  90 tablet     Refill:  2       Orders Placed This Encounter   Procedures    CBC Auto Differential     Standing Status:   Future     Number of Occurrences:   1     Standing Expiration Date:   3/2/2022    Comprehensive Metabolic Panel     Standing Status:   Future     Number of Occurrences:   1     Standing Expiration Date:   3/1/2022    Lipid, Fasting     Standing Status:   Future     Number of Occurrences:   1     Standing Expiration Date:   3/1/2022    PSA Screening     Standing Status:   Future     Number of Occurrences:   1     Standing Expiration Date:   3/2/2022    Microalbumin, Ur     Standing Status:   Future     Standing Expiration Date:   3/2/2022      Instructed to increase fiber in diet, drink at least 64 ounces of water daily, and decrease salt in diet. Refill atorvastatin and instructed to take nightly as prescribed. Patient given educational materials - see patient instructions. Encouraged healthy diet and routine exercise. Instructed to continue current medications. All patient questions answered. Pt voiced understanding. Health Maintenance reviewed. Patient agreed with treatment plan. Follow up as directed. Return in about 2 weeks (around 3/16/2021) for HTN.      Electronically signed by SUNITHA Zayas CNP on 3/2/2021 at 1:30 PM.

## 2021-03-03 LAB
CHOLESTEROL, FASTING: 260 MG/DL
CHOLESTEROL/HDL RATIO: 5.1
HDLC SERPL-MCNC: 51 MG/DL
LDL CHOLESTEROL: 172 MG/DL (ref 0–130)
PROSTATE SPECIFIC ANTIGEN: 1.42 UG/L
TRIGLYCERIDE, FASTING: 187 MG/DL
VLDLC SERPL CALC-MCNC: ABNORMAL MG/DL (ref 1–30)

## 2021-03-17 ENCOUNTER — OFFICE VISIT (OUTPATIENT)
Dept: FAMILY MEDICINE CLINIC | Age: 51
End: 2021-03-17
Payer: COMMERCIAL

## 2021-03-17 VITALS
BODY MASS INDEX: 27.67 KG/M2 | SYSTOLIC BLOOD PRESSURE: 142 MMHG | DIASTOLIC BLOOD PRESSURE: 90 MMHG | HEART RATE: 76 BPM | WEIGHT: 219.9 LBS | OXYGEN SATURATION: 90 %

## 2021-03-17 DIAGNOSIS — I10 ESSENTIAL HYPERTENSION: Primary | ICD-10-CM

## 2021-03-17 DIAGNOSIS — K21.9 GASTROESOPHAGEAL REFLUX DISEASE WITHOUT ESOPHAGITIS: ICD-10-CM

## 2021-03-17 DIAGNOSIS — E78.2 MIXED HYPERLIPIDEMIA: ICD-10-CM

## 2021-03-17 DIAGNOSIS — Z80.42 FAMILY HISTORY OF MALIGNANT NEOPLASM OF PROSTATE: ICD-10-CM

## 2021-03-17 PROCEDURE — 99214 OFFICE O/P EST MOD 30 MIN: CPT | Performed by: NURSE PRACTITIONER

## 2021-03-17 RX ORDER — LISINOPRIL 5 MG/1
5 TABLET ORAL DAILY
Qty: 30 TABLET | Refills: 5 | Status: SHIPPED | OUTPATIENT
Start: 2021-03-17 | End: 2021-03-29 | Stop reason: SDUPTHER

## 2021-03-17 NOTE — PROGRESS NOTES
[facility]  [address] [facility], [facility]  Dept: [phone]  Dept Fax: [phone]    Kailash Weston is a 48 y.o. male who presents today for his medical conditions/complaints as noted below. Kailash Weston c/o of Hypertension (f/u denies any chest pains sob dizziness leg edema) and Results (Cholesterol is elevated, ALT (liver enzyme) is also elevated)      HPI:   Patient presents to the office for 2-week follow-up of an elevated blood pressure reading. Hypertension  This is a new problem. The current episode started 1 to 4 weeks ago. The problem is unchanged. The problem is uncontrolled. Pertinent negatives include no anxiety, blurred vision, chest pain, headaches, malaise/fatigue, orthopnea, palpitations, peripheral edema, PND or shortness of breath. There are no associated agents to hypertension. Risk factors for coronary artery disease include male gender and dyslipidemia. Past treatments include nothing. The current treatment provides no improvement. Compliance problems include exercise and diet.       The 10-year ASCVD risk score (Domenico Rivera, et al., 2013) is: 5.9%    Values used to calculate the score:      Age: 48 years      Sex: Male      Is Non- : No      Diabetic: No      Tobacco smoker: No      Systolic Blood Pressure: 107 mmHg      Is BP treated: No      HDL Cholesterol: 51 mg/dL      Total Cholesterol: 260 mg/dL    BP Readings from Last 3 Encounters:   03/17/21 (!) 142/90   03/02/21 (!) 154/104   01/17/20 134/88              ([id number])    Pulse Readings from Last 3 Encounters:   03/17/21 76   03/02/21 84   01/17/20 74        Wt Readings from Last 3 Encounters:   03/17/21 219 lb 14.4 oz (99.7 kg)   03/02/21 218 lb 9.6 oz (99.2 kg)   01/17/20 213 lb 3.2 oz (96.7 kg)       Past Medical History:   Diagnosis Date    Gastroesophageal reflux disease without esophagitis     Hyperlipidemia       Past Surgical History:   Procedure Laterality Date   Jasmyn Low biopsy negative; dilatation performed    UPPER GASTROINTESTINAL ENDOSCOPY  10/2015    Dr Aquilino Cid -negative    VASECTOMY  2004       Family History   Problem Relation Age of Onset    Cancer Father     Prostate Cancer Father     High Blood Pressure Mother        Social History     Tobacco Use    Smoking status: Never Smoker    Smokeless tobacco: Never Used   Substance Use Topics    Alcohol use: Yes     Comment: Social      Current Outpatient Medications   Medication Sig Dispense Refill    lisinopril (PRINIVIL;ZESTRIL) 5 MG tablet Take 1 tablet by mouth daily 30 tablet 5    atorvastatin (LIPITOR) 40 MG tablet Take 1 tablet by mouth nightly 90 tablet 2    omeprazole (PRILOSEC) 20 MG capsule Take 20 mg by mouth daily       No current facility-administered medications for this visit. No Known Allergies    Health Maintenance   Topic Date Due    Hepatitis C screen  Never done    HIV screen  Never done    COVID-19 Vaccine (1) Never done    Shingles Vaccine (1 of 2) Never done    Colon cancer screen colonoscopy  Never done    Flu vaccine (1) Never done    Lipid screen  03/02/2022    Potassium monitoring  03/02/2022    Creatinine monitoring  03/02/2022    DTaP/Tdap/Td vaccine (2 - Td) 09/18/2025    Hepatitis A vaccine  Aged Out    Hepatitis B vaccine  Aged Out    Hib vaccine  Aged Out    Meningococcal (ACWY) vaccine  Aged Out    Pneumococcal 0-64 years Vaccine  Aged Out       Subjective:      Review of Systems   Constitutional: Negative for chills, diaphoresis, fatigue, fever and malaise/fatigue. HENT: Negative. Eyes: Negative. Negative for blurred vision. Respiratory: Negative for cough, shortness of breath and wheezing. Cardiovascular: Negative for chest pain, palpitations, orthopnea, leg swelling and PND.    Gastrointestinal: Negative for abdominal pain, constipation, diarrhea and nausea. Endocrine: Negative for cold intolerance, heat intolerance, polydipsia, polyphagia and polyuria. Genitourinary: Negative. Musculoskeletal: Negative for arthralgias and myalgias. Skin: Negative. Allergic/Immunologic: Negative for environmental allergies. Neurological: Negative for dizziness, light-headedness and headaches. Psychiatric/Behavioral: Negative for agitation, decreased concentration, dysphoric mood, self-injury, sleep disturbance and suicidal ideas. The patient is not nervous/anxious. Objective:     Vitals:    03/17/21 1514   BP: (!) 142/90   Pulse: 76   SpO2: 90%   Weight: 219 lb 14.4 oz (99.7 kg)        Estimated body mass index is 27.67 kg/m² as calculated from the following:    Height as of 3/2/21: 6' 2.75\" (1.899 m). Weight as of this encounter: 219 lb 14.4 oz (99.7 kg). Physical Exam  Constitutional:       Appearance: Normal appearance. He is well-developed and well-groomed. HENT:      Head: Normocephalic. Eyes:      Conjunctiva/sclera: Conjunctivae normal.   Neck:      Musculoskeletal: Neck supple. Thyroid: No thyromegaly. Vascular: No carotid bruit or JVD. Cardiovascular:      Rate and Rhythm: Normal rate and regular rhythm. Heart sounds: Normal heart sounds. Pulmonary:      Effort: Pulmonary effort is normal. No respiratory distress. Breath sounds: Normal breath sounds. No wheezing. Abdominal:      General: Bowel sounds are normal.      Palpations: Abdomen is soft. Tenderness: There is no abdominal tenderness. Musculoskeletal:      Right lower leg: No edema. Left lower leg: No edema. Lymphadenopathy:      Cervical: No cervical adenopathy. Skin:     Capillary Refill: Capillary refill takes less than 2 seconds. Neurological:      Mental Status: He is alert and oriented to person, place, and time.    Psychiatric:         Mood and Affect: Mood normal. Behavior: Behavior is cooperative. PHQ Scores 3/2/2021 1/17/2020 9/26/2018 6/27/2018 9/21/2017   PHQ2 Score 0 0 0 0 0   PHQ9 Score 0 0 0 0 0     Interpretation of Total Score Depression Severity: 1-4 = Minimal depression, 5-9 = Mild depression, 10-14 = Moderate depression, 15-19 = Moderately severe depression, 20-27 = Severe depression     Lab Results   Component Value Date     03/02/2021    K 4.1 03/02/2021     03/02/2021    CO2 24 03/02/2021    BUN 10 03/02/2021    CREATININE 0.98 03/02/2021    GLUCOSE 87 03/02/2021    CALCIUM 9.9 03/02/2021    PROT 7.1 03/02/2021    LABALBU 4.5 03/02/2021    BILITOT 0.61 03/02/2021    ALKPHOS 84 03/02/2021    AST 35 03/02/2021    ALT 60 (H) 03/02/2021    LABGLOM >60 03/02/2021    GFRAA >60 03/02/2021    AGRATIO 1.5 09/21/2018    GLOB 2.8 09/21/2018     Lab Results   Component Value Date    LABA1C 5 12/06/2014       Lab Results   Component Value Date    LDLCALC 106.4 12/21/2018    CREATININE 0.98 03/02/2021       Assessment:     1. Essential hypertension    2. Mixed hyperlipidemia    3. Gastroesophageal reflux disease without esophagitis    4. Family history of malignant neoplasm of prostate        Plan:       Orders Placed This Encounter   Medications    lisinopril (PRINIVIL;ZESTRIL) 5 MG tablet     Sig: Take 1 tablet by mouth daily     Dispense:  30 tablet     Refill:  5      Start taking lisinopril 5 mg daily. Discussed use, benefit, and side effects of prescribed medications. All patient questions answered. Patient voiced understanding. Health Maintenance reviewed. Instructed to continue current medications, diet and exercise. Patient agreed with treatment plan. Follow up as directed. Return in about 2 weeks (around 3/31/2021).     Electronically signed by SUNITHA Beck CNP on 3/24/2021

## 2021-03-24 ASSESSMENT — ENCOUNTER SYMPTOMS
ABDOMINAL PAIN: 0
NAUSEA: 0
DIARRHEA: 0
WHEEZING: 0
ORTHOPNEA: 0
EYES NEGATIVE: 1
COUGH: 0
SHORTNESS OF BREATH: 0
CONSTIPATION: 0
BLURRED VISION: 0

## 2021-03-29 DIAGNOSIS — I10 ESSENTIAL HYPERTENSION: ICD-10-CM

## 2021-03-29 RX ORDER — LISINOPRIL 5 MG/1
5 TABLET ORAL DAILY
Qty: 90 TABLET | Refills: 2 | Status: SHIPPED | OUTPATIENT
Start: 2021-03-29 | End: 2021-10-04 | Stop reason: SDUPTHER

## 2021-03-29 NOTE — TELEPHONE ENCOUNTER
Sumit Galeano is calling to request a refill on the following medication(s):  Requested Prescriptions     Pending Prescriptions Disp Refills    lisinopril (PRINIVIL;ZESTRIL) 5 MG tablet 90 tablet 0     Sig: Take 1 tablet by mouth daily       Last Visit Date (If Applicable):  1/98/7401    Next Visit Date:    3/31/2021

## 2021-03-31 ENCOUNTER — OFFICE VISIT (OUTPATIENT)
Dept: FAMILY MEDICINE CLINIC | Age: 51
End: 2021-03-31
Payer: COMMERCIAL

## 2021-03-31 VITALS
SYSTOLIC BLOOD PRESSURE: 128 MMHG | DIASTOLIC BLOOD PRESSURE: 78 MMHG | BODY MASS INDEX: 27.81 KG/M2 | OXYGEN SATURATION: 92 % | WEIGHT: 221 LBS | HEART RATE: 86 BPM

## 2021-03-31 DIAGNOSIS — I10 ESSENTIAL HYPERTENSION: Primary | ICD-10-CM

## 2021-03-31 DIAGNOSIS — Z23 NEED FOR PROPHYLACTIC VACCINATION AND INOCULATION AGAINST VARICELLA: ICD-10-CM

## 2021-03-31 PROBLEM — R03.0 ELEVATED BLOOD PRESSURE READING: Status: RESOLVED | Noted: 2021-03-02 | Resolved: 2021-03-31

## 2021-03-31 PROCEDURE — 99213 OFFICE O/P EST LOW 20 MIN: CPT | Performed by: NURSE PRACTITIONER

## 2021-03-31 ASSESSMENT — ENCOUNTER SYMPTOMS
CONSTIPATION: 0
WHEEZING: 0
EYES NEGATIVE: 1
BLURRED VISION: 0
ABDOMINAL PAIN: 0
COUGH: 0
SHORTNESS OF BREATH: 0
DIARRHEA: 0
ORTHOPNEA: 0

## 2021-03-31 NOTE — PROGRESS NOTES
[address] [facility]  [address] [facility], [address] [facility]  Dept: [phone]  Dept Fax: [phone]    Jasmin Tamez is a 48 y.o. male who presents today for his medical conditions/complaints as noted below. Jasmin Tamez c/o of Follow-up (htn last visit Started taking lisinopril 5 mg daily, denies any chest pains sob dizziness leg edema)      HPI:   Patient presents to the office for follow up of hypertension. He was started on lisinopril 5 mg daily 2 weeks ago. States he is feeling much better. Seems to have more energy. Hypertension  This is a new problem. The current episode started more than 1 month ago. The problem is controlled. Pertinent negatives include no anxiety, blurred vision, chest pain, headaches, malaise/fatigue, orthopnea, palpitations, peripheral edema, PND or shortness of breath. There are no associated agents to hypertension. Risk factors for coronary artery disease include male gender, family history and dyslipidemia. Past treatments include ACE inhibitors. The current treatment provides significant improvement. There are no compliance problems.         BP Readings from Last 3 Encounters:   03/31/21 128/78   03/17/21 (!) 142/90   03/02/21 (!) 154/104              ([id number])    Pulse Readings from Last 3 Encounters:   03/31/21 86   03/17/21 76   03/02/21 84        Wt Readings from Last 3 Encounters:   03/31/21 221 lb (100.2 kg)   03/17/21 219 lb 14.4 oz (99.7 kg)   03/02/21 218 lb 9.6 oz (99.2 kg)       Past Medical History:   Diagnosis Date    Gastroesophageal reflux disease without esophagitis     Hyperlipidemia       Past Surgical History:   Procedure Laterality Date    UPPER GASTROINTESTINAL ENDOSCOPY  1998    Dr Theodor Goodpasture Dr Lenon Edinger biopsy negative; dilatation performed    UPPER GASTROINTESTINAL ENDOSCOPY  10/2015    Dr [place] -negative    VASECTOMY  2004       Family History Problem Relation Age of Onset    Cancer Father     Prostate Cancer Father     High Blood Pressure Mother        Social History     Tobacco Use    Smoking status: Never Smoker    Smokeless tobacco: Never Used   Substance Use Topics    Alcohol use: Yes     Comment: Social      Current Outpatient Medications   Medication Sig Dispense Refill    zoster recombinant adjuvanted vaccine (SHINGRIX) 50 MCG/0.5ML SUSR injection Inject 0.5 mLs into the muscle See Admin Instructions 1 dose now and repeat in 2-6 months 1 each 0    lisinopril (PRINIVIL;ZESTRIL) 5 MG tablet Take 1 tablet by mouth daily 90 tablet 2    atorvastatin (LIPITOR) 40 MG tablet Take 1 tablet by mouth nightly 90 tablet 2    omeprazole (PRILOSEC) 20 MG capsule Take 20 mg by mouth daily       No current facility-administered medications for this visit. No Known Allergies    Health Maintenance   Topic Date Due    Hepatitis C screen  Never done    HIV screen  Never done    COVID-19 Vaccine (1) Never done    Shingles Vaccine (1 of 2) Never done    Colon cancer screen colonoscopy  Never done    Flu vaccine (1) Never done    Lipid screen  03/02/2022    Potassium monitoring  03/02/2022    Creatinine monitoring  03/02/2022    DTaP/Tdap/Td vaccine (2 - Td) 09/18/2025    Hepatitis A vaccine  Aged Out    Hepatitis B vaccine  Aged Out    Hib vaccine  Aged Out    Meningococcal (ACWY) vaccine  Aged Out    Pneumococcal 0-64 years Vaccine  Aged Out       Subjective:      Review of Systems   Constitutional: Negative for chills, fatigue, fever and malaise/fatigue. HENT: Negative. Eyes: Negative. Negative for blurred vision. Respiratory: Negative for cough, shortness of breath and wheezing. Cardiovascular: Negative for chest pain, palpitations, orthopnea, leg swelling and PND. Gastrointestinal: Negative for abdominal pain, constipation and diarrhea.    Endocrine: Negative for cold intolerance, heat intolerance, polydipsia, polyphagia and polyuria. Genitourinary: Negative. Musculoskeletal: Negative for arthralgias and myalgias. Skin: Negative. Allergic/Immunologic: Negative for environmental allergies and food allergies. Neurological: Negative for dizziness, weakness and headaches. Psychiatric/Behavioral: Negative for dysphoric mood and sleep disturbance. The patient is not nervous/anxious. Objective:     Vitals:    03/31/21 1453 03/31/21 1528   BP: (!) 132/90 128/78   Pulse: 86    SpO2: 92%    Weight: 221 lb (100.2 kg)         Estimated body mass index is 27.81 kg/m² as calculated from the following:    Height as of 3/2/21: 6' 2.75\" (1.899 m). Weight as of this encounter: 221 lb (100.2 kg). Physical Exam  Constitutional:       Appearance: Normal appearance. He is well-developed and well-groomed. HENT:      Head: Normocephalic. Eyes:      Conjunctiva/sclera: Conjunctivae normal.   Neck:      Musculoskeletal: Neck supple. Thyroid: No thyromegaly. Vascular: No carotid bruit. Cardiovascular:      Rate and Rhythm: Normal rate and regular rhythm. Heart sounds: Normal heart sounds. Pulmonary:      Effort: Pulmonary effort is normal.      Breath sounds: Normal breath sounds. No wheezing. Musculoskeletal:      Right lower leg: No edema. Left lower leg: No edema. Lymphadenopathy:      Cervical: No cervical adenopathy. Skin:     Capillary Refill: Capillary refill takes less than 2 seconds. Neurological:      Mental Status: He is alert and oriented to person, place, and time. Psychiatric:         Behavior: Behavior is cooperative.          PHQ Scores 3/2/2021 1/17/2020 9/26/2018 6/27/2018 9/21/2017   PHQ2 Score 0 0 0 0 0   PHQ9 Score 0 0 0 0 0     Interpretation of Total Score Depression Severity: 1-4 = Minimal depression, 5-9 = Mild depression, 10-14 = Moderate depression, 15-19 = Moderately severe depression, 20-27 = Severe depression     Lab Results   Component Value Date     03/02/2021    K 4.1 03/02/2021     03/02/2021    CO2 24 03/02/2021    BUN 10 03/02/2021    CREATININE 0.98 03/02/2021    GLUCOSE 87 03/02/2021    CALCIUM 9.9 03/02/2021    PROT 7.1 03/02/2021    LABALBU 4.5 03/02/2021    BILITOT 0.61 03/02/2021    ALKPHOS 84 03/02/2021    AST 35 03/02/2021    ALT 60 (H) 03/02/2021    LABGLOM >60 03/02/2021    GFRAA >60 03/02/2021    AGRATIO 1.5 09/21/2018    GLOB 2.8 09/21/2018     Lab Results   Component Value Date    LABA1C 5 12/06/2014       Lab Results   Component Value Date    LDLCALC 106.4 12/21/2018    CREATININE 0.98 03/02/2021       Assessment:     1. Essential hypertension    2. Need for prophylactic vaccination and inoculation against varicella          Plan:       Orders Placed This Encounter   Medications    zoster recombinant adjuvanted vaccine (SHINGRIX) 50 MCG/0.5ML SUSR injection     Sig: Inject 0.5 mLs into the muscle See Admin Instructions 1 dose now and repeat in 2-6 months     Dispense:  1 each     Refill:  0     Discussed use, benefit, and side effects of prescribed medications. All patient questions answered. Patient voiced understanding. Instructed to continue current medications, diet and exercise. Patient agreed with treatment plan. Follow up as directed. Return in about 6 months (around 9/30/2021).     Electronically signed by SUNITHA Fiore CNP on 3/31/2021

## 2021-05-03 DIAGNOSIS — E78.2 MIXED HYPERLIPIDEMIA: ICD-10-CM

## 2021-05-03 RX ORDER — ATORVASTATIN CALCIUM 40 MG/1
40 TABLET, FILM COATED ORAL NIGHTLY
Qty: 90 TABLET | Refills: 2 | Status: SHIPPED | OUTPATIENT
Start: 2021-05-03 | End: 2021-10-04 | Stop reason: SDUPTHER

## 2021-05-03 NOTE — TELEPHONE ENCOUNTER
Kaya Jesus is calling to request a refill on the following medication(s):  Requested Prescriptions     Pending Prescriptions Disp Refills    atorvastatin (LIPITOR) 40 MG tablet 90 tablet 2     Sig: Take 1 tablet by mouth nightly       Last Visit Date (If Applicable):  6/43/4985    Next Visit Date:    10/4/2021

## 2021-10-04 ENCOUNTER — OFFICE VISIT (OUTPATIENT)
Dept: FAMILY MEDICINE CLINIC | Age: 51
End: 2021-10-04
Payer: COMMERCIAL

## 2021-10-04 VITALS
DIASTOLIC BLOOD PRESSURE: 86 MMHG | OXYGEN SATURATION: 98 % | BODY MASS INDEX: 26.03 KG/M2 | HEART RATE: 71 BPM | WEIGHT: 206.9 LBS | SYSTOLIC BLOOD PRESSURE: 126 MMHG

## 2021-10-04 DIAGNOSIS — E78.2 MIXED HYPERLIPIDEMIA: ICD-10-CM

## 2021-10-04 DIAGNOSIS — Z80.42 FAMILY HISTORY OF MALIGNANT NEOPLASM OF PROSTATE: ICD-10-CM

## 2021-10-04 DIAGNOSIS — K21.9 GASTROESOPHAGEAL REFLUX DISEASE WITHOUT ESOPHAGITIS: ICD-10-CM

## 2021-10-04 DIAGNOSIS — I10 ESSENTIAL HYPERTENSION: Primary | ICD-10-CM

## 2021-10-04 PROCEDURE — 99214 OFFICE O/P EST MOD 30 MIN: CPT | Performed by: NURSE PRACTITIONER

## 2021-10-04 RX ORDER — ATORVASTATIN CALCIUM 40 MG/1
40 TABLET, FILM COATED ORAL NIGHTLY
Qty: 90 TABLET | Refills: 3 | Status: SHIPPED | OUTPATIENT
Start: 2021-10-04 | End: 2022-11-04 | Stop reason: SDUPTHER

## 2021-10-04 RX ORDER — LISINOPRIL 5 MG/1
5 TABLET ORAL DAILY
Qty: 90 TABLET | Refills: 3 | Status: SHIPPED | OUTPATIENT
Start: 2021-10-04

## 2021-10-04 ASSESSMENT — ENCOUNTER SYMPTOMS
CONSTIPATION: 0
ABDOMINAL PAIN: 0
EYES NEGATIVE: 1
WHEEZING: 0
DIARRHEA: 0
BLURRED VISION: 0
SHORTNESS OF BREATH: 0
COUGH: 0
ORTHOPNEA: 0

## 2021-10-04 NOTE — PROGRESS NOTES
[address] [facility]  [address] [facility], [address] [facility]  Dept: [phone]  Dept Fax: [phone]    Lucy Simpson is a 46 y.o. male who presents today for his medical conditions/complaints as noted below. Lucy Simpson c/o of 6 Month Follow-Up (denies issues or complaints), Hypertension, Gastroesophageal Reflux, and Hyperlipidemia      HPI:   Patient presents to the office for routine 6-month follow-up. Has been walking on the treadmill for 30 minutes, 3 times a week. He has also been watching the salt in his diet. Hypertension  This is a new problem. The current episode started more than 1 month ago. The problem is controlled. Pertinent negatives include no anxiety, blurred vision, chest pain, headaches, malaise/fatigue, orthopnea, palpitations, peripheral edema, PND or shortness of breath. There are no associated agents to hypertension. Risk factors for coronary artery disease include male gender, family history and dyslipidemia. Past treatments include ACE inhibitors. The current treatment provides significant improvement. There are no compliance problems. There is no history of chronic renal disease. Gastroesophageal Reflux  He reports no abdominal pain, no belching, no chest pain, no coughing, no dysphagia, no heartburn, no nausea, no sore throat or no wheezing. This is a chronic problem. The problem occurs occasionally. The problem has been gradually improving. The symptoms are aggravated by certain foods. Pertinent negatives include no anemia, fatigue, melena, muscle weakness or orthopnea. He has tried a PPI for the symptoms. The treatment provided significant relief. Hyperlipidemia  This is a chronic problem. The current episode started more than 1 year ago. The problem is controlled. Recent lipid tests were reviewed and are normal. He has no history of chronic renal disease, diabetes, hypothyroidism or liver disease.  Factors aggravating his hyperlipidemia include fatty foods. Pertinent negatives include no chest pain, focal sensory loss, focal weakness, leg pain, myalgias or shortness of breath. Current antihyperlipidemic treatment includes statins. The current treatment provides significant improvement of lipids. There are no compliance problems. Risk factors for coronary artery disease include dyslipidemia, hypertension and male sex.      The 10-year ASCVD risk score (Mackenzie Eisenberg et al., 2013) is: 6.1%    Values used to calculate the score:      Age: 46 years      Sex: Male      Is Non- : No      Diabetic: No      Tobacco smoker: No      Systolic Blood Pressure: 969 mmHg      Is BP treated: Yes      HDL Cholesterol: 51 mg/dL      Total Cholesterol: 260 mg/dL    BP Readings from Last 3 Encounters:   10/04/21 126/86   03/31/21 128/78   03/17/21 (!) 142/90              ([id number])    Pulse Readings from Last 3 Encounters:   10/04/21 71   03/31/21 86   03/17/21 76        Wt Readings from Last 3 Encounters:   10/04/21 206 lb 14.4 oz (93.8 kg)   03/31/21 221 lb (100.2 kg)   03/17/21 219 lb 14.4 oz (99.7 kg)       Past Medical History:   Diagnosis Date    External hemorrhoid 3/2/2021    Gastroesophageal reflux disease without esophagitis     Hyperlipidemia       Past Surgical History:   Procedure Laterality Date   [place]    Dr Olivia Dia biopsy negative; dilatation performed    UPPER GASTROINTESTINAL ENDOSCOPY  10/2015    Dr Packer [place] -negative    VASECTOMY  2004       Family History   Problem Relation Age of Onset    Cancer Father     Prostate Cancer Father     High Blood Pressure Mother        Social History     Tobacco Use    Smoking status: Never Smoker    Smokeless tobacco: Never Used   Substance Use Topics    Alcohol use: Yes     Comment: Social      Current Outpatient Medications   Medication Sig Dispense Refill    atorvastatin (LIPITOR) 40 MG tablet Take 1 tablet by mouth nightly 90 tablet 3    lisinopril (PRINIVIL;ZESTRIL) 5 MG tablet Take 1 tablet by mouth daily 90 tablet 3    zoster recombinant adjuvanted vaccine (SHINGRIX) 50 MCG/0.5ML SUSR injection Inject 0.5 mLs into the muscle See Admin Instructions 1 dose now and repeat in 2-6 months 1 each 0    omeprazole (PRILOSEC) 20 MG capsule Take 20 mg by mouth daily       No current facility-administered medications for this visit. No Known Allergies    Health Maintenance   Topic Date Due    Hepatitis C screen  Never done    Colon cancer screen colonoscopy  Never done    Shingles Vaccine (1 of 2) Never done    Flu vaccine (1) 10/04/2022 (Originally 9/1/2021)    Lipid screen  03/02/2022    Potassium monitoring  03/02/2022    Creatinine monitoring  03/02/2022    DTaP/Tdap/Td vaccine (2 - Td or Tdap) 09/18/2025    COVID-19 Vaccine  Completed    Hepatitis A vaccine  Aged Out    Hepatitis B vaccine  Aged Out    Hib vaccine  Aged Out    Meningococcal (ACWY) vaccine  Aged Out    Pneumococcal 0-64 years Vaccine  Aged Out    HIV screen  Discontinued       Subjective:      Review of Systems   Constitutional: Negative for chills, fatigue, fever and malaise/fatigue. HENT: Negative. Negative for sore throat. Eyes: Negative. Negative for blurred vision. Respiratory: Negative for cough, shortness of breath and wheezing. Cardiovascular: Negative for chest pain, palpitations, orthopnea, leg swelling and PND. Gastrointestinal: Negative for abdominal pain, constipation, diarrhea, dysphagia, heartburn, melena and nausea. Endocrine: Negative for cold intolerance, heat intolerance, polydipsia, polyphagia and polyuria. Genitourinary: Negative. Musculoskeletal: Negative for arthralgias, myalgias and muscle weakness. Skin: Negative. Allergic/Immunologic: Negative for environmental allergies and food allergies.    Neurological: Negative for dizziness, focal weakness, weakness and headaches. Psychiatric/Behavioral: Negative for dysphoric mood and sleep disturbance. The patient is not nervous/anxious. Objective:     Vitals:    10/04/21 1508   BP: 126/86   Pulse: 71   SpO2: 98%   Weight: 206 lb 14.4 oz (93.8 kg)        Estimated body mass index is 26.03 kg/m² as calculated from the following:    Height as of 3/2/21: 6' 2.75\" (1.899 m). Weight as of this encounter: 206 lb 14.4 oz (93.8 kg). Physical Exam  Constitutional:       Appearance: Normal appearance. He is well-developed and well-groomed. HENT:      Head: Normocephalic. Eyes:      Conjunctiva/sclera: Conjunctivae normal.   Neck:      Thyroid: No thyromegaly. Vascular: No carotid bruit. Cardiovascular:      Rate and Rhythm: Normal rate and regular rhythm. Heart sounds: Normal heart sounds. Pulmonary:      Effort: Pulmonary effort is normal.      Breath sounds: Normal breath sounds. No wheezing. Musculoskeletal:      Cervical back: Neck supple. Right lower leg: No edema. Left lower leg: No edema. Lymphadenopathy:      Cervical: No cervical adenopathy. Skin:     Capillary Refill: Capillary refill takes less than 2 seconds. Neurological:      Mental Status: He is alert and oriented to person, place, and time. Psychiatric:         Behavior: Behavior is cooperative.          PHQ Scores 3/2/2021 1/17/2020 9/26/2018 6/27/2018 9/21/2017   PHQ2 Score 0 0 0 0 0   PHQ9 Score 0 0 0 0 0     Interpretation of Total Score Depression Severity: 1-4 = Minimal depression, 5-9 = Mild depression, 10-14 = Moderate depression, 15-19 = Moderately severe depression, 20-27 = Severe depression     Lab Results   Component Value Date     03/02/2021    K 4.1 03/02/2021     03/02/2021    CO2 24 03/02/2021    BUN 10 03/02/2021    CREATININE 0.98 03/02/2021    GLUCOSE 87 03/02/2021    CALCIUM 9.9 03/02/2021    PROT 7.1 03/02/2021    LABALBU 4.5 03/02/2021    BILITOT 0.61 03/02/2021    ALKPHOS 84 03/02/2021    AST 35 03/02/2021    ALT 60 (H) 03/02/2021    LABGLOM >60 03/02/2021    GFRAA >60 03/02/2021    AGRATIO 1.5 09/21/2018    GLOB 2.8 09/21/2018     Lab Results   Component Value Date    LDLCALC 106.4 12/21/2018    CREATININE 0.98 03/02/2021       Assessment:     1. Essential hypertension    2. Mixed hyperlipidemia    3. Gastroesophageal reflux disease without esophagitis    4. Family history of malignant neoplasm of prostate        Plan:       Orders Placed This Encounter   Medications    atorvastatin (LIPITOR) 40 MG tablet     Sig: Take 1 tablet by mouth nightly     Dispense:  90 tablet     Refill:  3    lisinopril (PRINIVIL;ZESTRIL) 5 MG tablet     Sig: Take 1 tablet by mouth daily     Dispense:  90 tablet     Refill:  3     Discussed use, benefit, and side effects of prescribed medications. All patient questions answered. Patient voiced understanding. Instructed to continue current medications, diet and exercise. Patient agreed with treatment plan. Follow up as directed. Return in about 6 months (around 4/4/2022).     Electronically signed by SUNITHA Wolf CNP on 10/10/2021

## 2021-10-04 NOTE — PATIENT INSTRUCTIONS
Thank you for enrolling in [address] Please follow the instructions below to securely access your online medical record. [employer] allows you to send messages to your doctor, view your test results, renew your prescriptions, schedule appointments, and more. How Do I Sign Up? 1. In your Internet browser, go to [email or web address]  2. Click on the Sign Up Now link in the Sign In box. You will see the New Member Sign Up page. 3. Enter your [employer] Access Code exactly as it appears below. You will not need to use this code after youve completed the sign-up process. If you do not sign up before the expiration date, you must request a new code. [employer] Access Code: Activation code not generated  Current [facility] [employer] Status: Patient Declined    4. Enter your Social Security Number ([id number]) and Date of Birth (mm/dd/yyyy) as indicated and click Submit. You will be taken to the next sign-up page. 5. Create a [employer] ID. This will be your [employer] login ID and cannot be changed, so think of one that is secure and easy to remember. 6. Create a [employer] password. You can change your password at any time. 7. Enter your Password Reset Question and Answer. This can be used at a later time if you forget your password. 8. Enter your e-mail address. You will receive e-mail notification when new information is available in [address] 9. Click Sign Up. You can now view your medical record. Additional Information  If you have questions, please contact your physician practice where you receive care. Remember, [employer] is NOT to be used for urgent needs. For medical emergencies, dial 911.

## 2021-10-10 PROBLEM — K64.4 EXTERNAL HEMORRHOID: Status: RESOLVED | Noted: 2021-03-02 | Resolved: 2021-10-10

## 2021-10-10 ASSESSMENT — ENCOUNTER SYMPTOMS
SORE THROAT: 0
HEARTBURN: 0
BELCHING: 0
NAUSEA: 0

## 2022-04-04 ENCOUNTER — OFFICE VISIT (OUTPATIENT)
Dept: FAMILY MEDICINE CLINIC | Age: 52
End: 2022-04-04
Payer: COMMERCIAL

## 2022-04-04 VITALS
DIASTOLIC BLOOD PRESSURE: 80 MMHG | WEIGHT: 214.7 LBS | HEART RATE: 83 BPM | OXYGEN SATURATION: 98 % | HEIGHT: 72 IN | SYSTOLIC BLOOD PRESSURE: 132 MMHG | BODY MASS INDEX: 29.08 KG/M2

## 2022-04-04 DIAGNOSIS — K21.9 GASTROESOPHAGEAL REFLUX DISEASE WITHOUT ESOPHAGITIS: ICD-10-CM

## 2022-04-04 DIAGNOSIS — E78.2 MIXED HYPERLIPIDEMIA: ICD-10-CM

## 2022-04-04 DIAGNOSIS — Z12.5 PROSTATE CANCER SCREENING: ICD-10-CM

## 2022-04-04 DIAGNOSIS — Z80.42 FAMILY HISTORY OF MALIGNANT NEOPLASM OF PROSTATE: ICD-10-CM

## 2022-04-04 DIAGNOSIS — I10 ESSENTIAL HYPERTENSION: Primary | ICD-10-CM

## 2022-04-04 PROCEDURE — 99214 OFFICE O/P EST MOD 30 MIN: CPT | Performed by: NURSE PRACTITIONER

## 2022-04-04 SDOH — ECONOMIC STABILITY: FOOD INSECURITY: WITHIN THE PAST 12 MONTHS, THE FOOD YOU BOUGHT JUST DIDN'T LAST AND YOU DIDN'T HAVE MONEY TO GET MORE.: NEVER TRUE

## 2022-04-04 SDOH — ECONOMIC STABILITY: FOOD INSECURITY: WITHIN THE PAST 12 MONTHS, YOU WORRIED THAT YOUR FOOD WOULD RUN OUT BEFORE YOU GOT MONEY TO BUY MORE.: NEVER TRUE

## 2022-04-04 ASSESSMENT — PATIENT HEALTH QUESTIONNAIRE - PHQ9
SUM OF ALL RESPONSES TO PHQ9 QUESTIONS 1 & 2: 0
SUM OF ALL RESPONSES TO PHQ QUESTIONS 1-9: 0
1. LITTLE INTEREST OR PLEASURE IN DOING THINGS: 0
2. FEELING DOWN, DEPRESSED OR HOPELESS: 0

## 2022-04-04 ASSESSMENT — ENCOUNTER SYMPTOMS
CHEST TIGHTNESS: 0
SHORTNESS OF BREATH: 0

## 2022-04-04 ASSESSMENT — SOCIAL DETERMINANTS OF HEALTH (SDOH): HOW HARD IS IT FOR YOU TO PAY FOR THE VERY BASICS LIKE FOOD, HOUSING, MEDICAL CARE, AND HEATING?: NOT HARD AT ALL

## 2022-04-04 NOTE — PROGRESS NOTES
[address] [address] [address]  Dept: [phone]  Dept Fax: [phone]    History and Physical  Patient:  Belinda Grewal  YOB: 1970  Date of Service:  2022    Subjective:   Belinda Grewal (:  1970) is a 46 y.o. male, Established patient, here for evaluation of the following chief complaint(s):    Chief Complaint   Patient presents with    6 Month Follow-Up     htn,hyperlipidema,gerd denies any issues or complaints      HPI  Hypertension  This is a new problem. The current episode started more than 1 month ago. The problem is controlled. Pertinent negatives include no anxiety, blurred vision, chest pain, headaches, malaise/fatigue, orthopnea, palpitations, peripheral edema, PND or shortness of breath. There are no associated agents to hypertension. Risk factors for coronary artery disease include male gender, family history and dyslipidemia. Past treatments include ACE inhibitors. The current treatment provides significant improvement. There are no compliance problems. There is no history of chronic renal disease. Gastroesophageal Reflux  He reports no abdominal pain, no belching, no chest pain, no coughing, no dysphagia, no heartburn, no nausea, no sore throat or no wheezing. This is a chronic problem. The problem occurs occasionally. The problem has been gradually improving. The symptoms are aggravated by certain foods. Pertinent negatives include no anemia, fatigue, melena, muscle weakness or orthopnea. He has tried a PPI for the symptoms. The treatment provided significant relief. Hyperlipidemia  This is a chronic problem. The current episode started more than 1 year ago. The problem is controlled. Recent lipid tests were reviewed and are normal. He has no history of chronic renal disease, diabetes, hypothyroidism or liver disease. Factors aggravating his hyperlipidemia include fatty foods.  Pertinent negatives include no chest pain, focal sensory loss, focal weakness, leg pain, myalgias or shortness of breath. Current antihyperlipidemic treatment includes statins. The current treatment provides significant improvement of lipids. There are no compliance problems. Risk factors for coronary artery disease include dyslipidemia, hypertension and male sex. The 10-year ASCVD risk score (Cornelia Morris et al., 2013) is: 6.6%    Values used to calculate the score:      Age: 46 years      Sex: Male      Is Non- : No      Diabetic: No      Tobacco smoker: No      Systolic Blood Pressure: 503 mmHg      Is BP treated: Yes      HDL Cholesterol: 51 mg/dL      Total Cholesterol: 260 mg/dL     BP Readings from Last 3 Encounters:   04/04/22 132/80   10/04/21 126/86   03/31/21 128/78      Pulse Readings from Last 3 Encounters:   04/04/22 83   10/04/21 71   03/31/21 86      Wt Readings from Last 3 Encounters:   04/04/22 214 lb 11.2 oz (97.4 kg)   10/04/21 206 lb 14.4 oz (93.8 kg)   03/31/21 221 lb (100.2 kg)        No Known Allergies    Current Outpatient Medications   Medication Sig Dispense Refill    atorvastatin (LIPITOR) 40 MG tablet Take 1 tablet by mouth nightly 90 tablet 3    lisinopril (PRINIVIL;ZESTRIL) 5 MG tablet Take 1 tablet by mouth daily 90 tablet 3    omeprazole (PRILOSEC) 20 MG capsule Take 20 mg by mouth daily       No current facility-administered medications for this visit.         Past Medical History:   Diagnosis Date    External hemorrhoid 3/2/2021    Gastroesophageal reflux disease without esophagitis     Hyperlipidemia        Past Surgical History:   Procedure Laterality Date    UPPER GASTROINTESTINAL ENDOSCOPY  1998    Dr Carlyle Garcia Saliva biopsy negative; dilatation performed    UPPER GASTROINTESTINAL ENDOSCOPY  10/2015    Dr Ewa Marley -negative    VASECTOMY  2004     Family History   Problem Relation Age of Onset    Cancer Father     Prostate Cancer Father     High Blood Pressure Mother      Social History     Tobacco Use    Smoking status: Never Smoker    Smokeless tobacco: Never Used   Substance Use Topics    Alcohol use: Yes     Comment: Social    Drug use: No       Review of Systems:     Review of Systems   Constitutional: Negative for appetite change, chills, fatigue and fever. HENT: Negative. Eyes: Negative. Respiratory: Negative for cough, chest tightness, shortness of breath and wheezing. Cardiovascular: Negative for chest pain, palpitations and leg swelling. Gastrointestinal: Negative for abdominal pain, constipation and diarrhea. Endocrine: Negative for cold intolerance, heat intolerance, polydipsia, polyphagia and polyuria. Genitourinary: Negative. Musculoskeletal: Negative for arthralgias and myalgias. Skin: Negative. Allergic/Immunologic: Negative for environmental allergies and food allergies. Neurological: Negative for dizziness, weakness, light-headedness and headaches. Psychiatric/Behavioral: Negative for agitation, dysphoric mood, self-injury, sleep disturbance and suicidal ideas. The patient is not nervous/anxious. PHQ Scores 4/4/2022 3/2/2021 1/17/2020 9/26/2018 6/27/2018 9/21/2017   PHQ2 Score 0 0 0 0 0 0   PHQ9 Score 0 0 0 0 0 0     Interpretation of Total Score Depression Severity: 1-4 = Minimal depression, 5-9 = Mild depression, 10-14 = Moderate depression, 15-19 = Moderately severe depression, 20-27 = Severe depression     Physical Exam:     Vitals:    04/04/22 1513   BP: 132/80   Pulse: 83   SpO2: 98%   Weight: 214 lb 11.2 oz (97.4 kg)   Height: 6' (1.829 m)      Body mass index is 29.12 kg/m². Physical Exam  Constitutional:       Appearance: Normal appearance. He is well-developed and well-groomed. HENT:      Head: Normocephalic. Eyes:      Conjunctiva/sclera: Conjunctivae normal.      Pupils: Pupils are equal, round, and reactive to light.    Neck: Thyroid: No thyromegaly. Vascular: No carotid bruit. Cardiovascular:      Rate and Rhythm: Normal rate and regular rhythm. Heart sounds: Normal heart sounds. Pulmonary:      Effort: Pulmonary effort is normal.      Breath sounds: Normal breath sounds. No wheezing. Abdominal:      General: Bowel sounds are normal.      Palpations: Abdomen is soft. Tenderness: There is no abdominal tenderness. Musculoskeletal:      Cervical back: Neck supple. Right lower leg: No edema. Left lower leg: No edema. Lymphadenopathy:      Cervical: No cervical adenopathy. Skin:     Capillary Refill: Capillary refill takes less than 2 seconds. Neurological:      Mental Status: He is alert and oriented to person, place, and time. Gait: Gait normal.   Psychiatric:         Mood and Affect: Mood normal.         Behavior: Behavior is cooperative. Assessment/Plan:   1. Essential hypertension  -     Lipid Panel; Future  -     Comprehensive Metabolic Panel; Future  -     CBC with Auto Differential; Future  2. Mixed hyperlipidemia  -     Lipid Panel; Future  -     Comprehensive Metabolic Panel; Future  -     CBC with Auto Differential; Future  3. Gastroesophageal reflux disease without esophagitis  4. Family history of malignant neoplasm of prostate  -     PSA Screening; Future  5. Prostate cancer screening  -     PSA Screening; Future       Refuses colonoscopy. All patient questions answered. Patient voiced understanding. Instructed to continue current medications. Patient agreed with treatment plan. Follow up as directed. Return in about 6 months (around 10/4/2022). Please note that this chart was generated using voice recognition Dragon dictation software. Although every effort was made to ensure the accuracy of this automated transcription, some errors in transcription may have occurred.     Electronically signed by SUNITHA Weber CNP on 4/10/2022

## 2022-04-10 ASSESSMENT — ENCOUNTER SYMPTOMS
WHEEZING: 0
DIARRHEA: 0
EYES NEGATIVE: 1
COUGH: 0
CONSTIPATION: 0
ABDOMINAL PAIN: 0

## 2022-04-14 LAB
ALBUMIN/GLOBULIN RATIO: 1.5 G/DL
ALBUMIN: 4.4 G/DL (ref 3.5–5)
ALP BLD-CCNC: 92 UNITS/L (ref 38–126)
ALT SERPL-CCNC: 45 UNITS/L (ref 4–50)
ANION GAP SERPL CALCULATED.3IONS-SCNC: 9.6 MMOL/L
AST SERPL-CCNC: 41 UNITS/L (ref 17–59)
BASOPHILS %: 0.8 (ref 0–3)
BASOPHILS ABSOLUTE: 0.05 (ref 0–0.3)
BILIRUB SERPL-MCNC: 0.9 MG/DL (ref 0.2–1.3)
BUN BLDV-MCNC: 12 MG/DL (ref 9–20)
CALCIUM SERPL-MCNC: 9.4 MG/DL (ref 8.4–10.2)
CHLORIDE BLD-SCNC: 102 MMOL/L (ref 98–120)
CHOLESTEROL/HDL RATIO: 3.49 RATIO (ref 0–4.5)
CHOLESTEROL: 206 MG/DL (ref 50–200)
CO2: 25 MMOL/L (ref 22–31)
CREAT SERPL-MCNC: 1.1 MG/DL (ref 0.7–1.3)
DIAGNOSTIC PSA: 0.92 NG/ML (ref 0–4)
EOSINOPHILS %: 2.56 (ref 0–10)
EOSINOPHILS ABSOLUTE: 0.15 (ref 0–1.1)
GFR CALCULATED: > 60
GLOBULIN: 2.9 G/DL
GLUCOSE: 102 MG/DL (ref 75–110)
HCT VFR BLD CALC: 49.4 % (ref 42–52)
HDLC SERPL-MCNC: 59 MG/DL (ref 36–68)
HEMOGLOBIN: 16.1 (ref 13.8–17.8)
LDL CHOLESTEROL CALCULATED: 89.8 MG/DL (ref 0–160)
LYMPHOCYTE %: 25.53 (ref 20–51.1)
LYMPHOCYTES ABSOLUTE: 1.5 (ref 1–5.5)
MCH RBC QN AUTO: 31 PG (ref 28.5–32.5)
MCHC RBC AUTO-ENTMCNC: 32.5 G/DL (ref 32–37)
MCV RBC AUTO: 95.4 FL (ref 80–94)
MONOCYTES %: 8.94 (ref 1.7–9.3)
MONOCYTES ABSOLUTE: 0.53 (ref 0.1–1)
NEUTROPHILS %: 62.16 (ref 42.2–75.2)
NEUTROPHILS ABSOLUTE: 3.66 (ref 2–8.1)
PDW BLD-RTO: 12 % (ref 10–15.5)
PLATELET # BLD: 206.3 THOU/MM3 (ref 130–400)
POTASSIUM SERPL-SCNC: 4 MMOL/L (ref 3.6–5)
RBC: 5.18 M/UL (ref 4.7–6.1)
SODIUM BLD-SCNC: 137 MMOL/L (ref 135–145)
TOTAL PROTEIN, SERUM: 7.4 G/DL (ref 6.3–8.2)
TRIGL SERPL-MCNC: 286 MG/DL (ref 10–250)
VLDLC SERPL CALC-MCNC: 57.2 MG/DL (ref 0–50)
WBC: 5.9 THOU/ML3 (ref 4.8–10.8)

## 2022-04-18 DIAGNOSIS — Z80.42 FAMILY HISTORY OF MALIGNANT NEOPLASM OF PROSTATE: ICD-10-CM

## 2022-04-18 DIAGNOSIS — I10 ESSENTIAL HYPERTENSION: ICD-10-CM

## 2022-04-18 DIAGNOSIS — Z12.5 PROSTATE CANCER SCREENING: ICD-10-CM

## 2022-04-18 DIAGNOSIS — E78.2 MIXED HYPERLIPIDEMIA: ICD-10-CM

## 2022-10-05 ENCOUNTER — OFFICE VISIT (OUTPATIENT)
Dept: FAMILY MEDICINE CLINIC | Age: 52
End: 2022-10-05
Payer: COMMERCIAL

## 2022-10-05 VITALS
BODY MASS INDEX: 28.59 KG/M2 | DIASTOLIC BLOOD PRESSURE: 84 MMHG | WEIGHT: 210.8 LBS | OXYGEN SATURATION: 97 % | SYSTOLIC BLOOD PRESSURE: 136 MMHG | HEART RATE: 78 BPM

## 2022-10-05 DIAGNOSIS — K21.9 GASTROESOPHAGEAL REFLUX DISEASE WITHOUT ESOPHAGITIS: ICD-10-CM

## 2022-10-05 DIAGNOSIS — Z80.42 FAMILY HISTORY OF MALIGNANT NEOPLASM OF PROSTATE: ICD-10-CM

## 2022-10-05 DIAGNOSIS — Z13.1 SCREENING FOR DIABETES MELLITUS: ICD-10-CM

## 2022-10-05 DIAGNOSIS — I10 ESSENTIAL HYPERTENSION: Primary | ICD-10-CM

## 2022-10-05 DIAGNOSIS — E78.2 MIXED HYPERLIPIDEMIA: ICD-10-CM

## 2022-10-05 LAB — HBA1C MFR BLD: 5.1 %

## 2022-10-05 PROCEDURE — 83036 HEMOGLOBIN GLYCOSYLATED A1C: CPT | Performed by: NURSE PRACTITIONER

## 2022-10-05 PROCEDURE — 99213 OFFICE O/P EST LOW 20 MIN: CPT | Performed by: NURSE PRACTITIONER

## 2022-10-05 NOTE — PROGRESS NOTES
[address] [address] [address]  Dept: [phone]  Dept Fax: [phone]    History and Physical  Patient:  Onel Gallardo  YOB: 1970  Date of Service:  10/5/2022    Assessment/Plan:   1. Essential hypertension  Assessment & Plan:   Well-controlled, continue current medications and lifestyle modifications recommended  2. Mixed hyperlipidemia  3. Gastroesophageal reflux disease without esophagitis  Assessment & Plan:   Well-controlled, continue current medications  4. Family history of malignant neoplasm of prostate  5. Screening for diabetes mellitus  -     POCT glycosylated hemoglobin (Hb A1C)     Results for POC orders placed in visit on 10/05/22   POCT glycosylated hemoglobin (Hb A1C)   Result Value Ref Range    Hemoglobin A1C 5.1 %     All patient questions answered. Patient voiced understanding. Instructed to continue current medications. Patient agreed with treatment plan. Follow up as directed. I have recommended that this patient have a colonoscopy but he declines at this time. I have discussed the risks and benefits of this examination with him. The patient verbalizes understanding. Return in about 6 months (around 2023). Subjective:   Onel Gallardo (:  1970) is a 46 y.o. male, Established patient, here for evaluation of the following chief complaint(s):    Chief Complaint   Patient presents with    Hyperlipidemia    Hypertension    Gastroesophageal Reflux    6 Month Follow-Up     No issues or complaints doing well     Has been under a lot of pressure at work. Hypertension  This is a new problem. The current episode started more than 1 month ago. The problem is controlled. Pertinent negatives include no anxiety, blurred vision, chest pain, headaches, malaise/fatigue, orthopnea, palpitations, peripheral edema, PND or shortness of breath. There are no associated agents to hypertension. Risk factors for coronary artery disease include male gender, family history and dyslipidemia. Past treatments include ACE inhibitors. The current treatment provides significant improvement. There are no compliance problems. There is no history of chronic renal disease. Hyperlipidemia  This is a chronic problem. The current episode started more than 1 year ago. The problem is controlled. Recent lipid tests were reviewed and are normal. He has no history of chronic renal disease, diabetes, hypothyroidism or liver disease. Factors aggravating his hyperlipidemia include fatty foods. Pertinent negatives include no chest pain, focal sensory loss, focal weakness, leg pain, myalgias or shortness of breath. Current antihyperlipidemic treatment includes statins. The current treatment provides significant improvement of lipids. There are no compliance problems. Risk factors for coronary artery disease include dyslipidemia, hypertension and male sex. Gastroesophageal Reflux  He reports no abdominal pain, no belching, no chest pain, no coughing, no dysphagia, no heartburn, no nausea, no sore throat or no wheezing. This is a chronic problem. The problem occurs occasionally. The problem has been gradually improving. The symptoms are aggravated by certain foods. Pertinent negatives include no anemia, fatigue, melena, muscle weakness or orthopnea. He has tried a PPI for the symptoms. The treatment provided significant relief.      The 10-year ASCVD risk score (Stella DK, et al., 2019) is: 4.9%    Values used to calculate the score:      Age: 46 years      Sex: Male      Is Non- : No      Diabetic: No      Tobacco smoker: No      Systolic Blood Pressure: 520 mmHg      Is BP treated: Yes      HDL Cholesterol: 59 mg/dL      Total Cholesterol: 206 mg/dL     BP Readings from Last 3 Encounters:   10/05/22 136/84   04/04/22 132/80   10/04/21 126/86      Pulse Readings from Last 3 Encounters:   10/05/22 78 04/04/22 83   10/04/21 71      Wt Readings from Last 3 Encounters:   10/05/22 210 lb 12.8 oz (95.6 kg)   04/04/22 214 lb 11.2 oz (97.4 kg)   10/04/21 206 lb 14.4 oz (93.8 kg)        No Known Allergies    Current Outpatient Medications   Medication Sig Dispense Refill    atorvastatin (LIPITOR) 40 MG tablet Take 1 tablet by mouth nightly 90 tablet 3    lisinopril (PRINIVIL;ZESTRIL) 5 MG tablet Take 1 tablet by mouth daily 90 tablet 3    omeprazole (PRILOSEC) 20 MG capsule Take 20 mg by mouth daily       No current facility-administered medications for this visit. Past Medical History:   Diagnosis Date    External hemorrhoid 3/2/2021    Gastroesophageal reflux disease without esophagitis     Hyperlipidemia        Past Surgical History:   Procedure Laterality Date    Shelly Bob biopsy negative; dilatation performed    UPPER GASTROINTESTINAL ENDOSCOPY  10/2015    Dr Archie Núñez -negative    VASECTOMY  2004     Family History   Problem Relation Age of Onset    Cancer Father     Prostate Cancer Father     High Blood Pressure Mother      Social History     Tobacco Use    Smoking status: Never    Smokeless tobacco: Never   Substance Use Topics    Alcohol use: Yes     Comment: Social    Drug use: No       Review of Systems:     Review of Systems   Constitutional:  Negative for chills, diaphoresis, fatigue and fever. HENT: Negative. Eyes: Negative. Respiratory:  Negative for cough, shortness of breath and wheezing. Cardiovascular:  Negative for chest pain, palpitations and leg swelling. Gastrointestinal:  Negative for abdominal pain, constipation, diarrhea and nausea. Endocrine: Negative for cold intolerance, heat intolerance, polydipsia, polyphagia and polyuria. Genitourinary: Negative. Musculoskeletal:  Negative for arthralgias and myalgias. Skin: Negative.     Allergic/Immunologic: Negative for environmental allergies. Neurological:  Negative for dizziness, light-headedness and headaches. Psychiatric/Behavioral:  Negative for agitation, decreased concentration, dysphoric mood, self-injury, sleep disturbance and suicidal ideas. The patient is not nervous/anxious. PHQ Scores 4/4/2022 3/2/2021 1/17/2020 9/26/2018 6/27/2018 9/21/2017   PHQ2 Score 0 0 0 0 0 0   PHQ9 Score 0 0 0 0 0 0     Interpretation of Total Score Depression Severity: 1-4 = Minimal depression, 5-9 = Mild depression, 10-14 = Moderate depression, 15-19 = Moderately severe depression, 20-27 = Severe depression     Physical Exam:     Vitals:    10/05/22 1528   BP: 136/84   Pulse: 78   SpO2: 97%   Weight: 210 lb 12.8 oz (95.6 kg)      Body mass index is 28.59 kg/m². Physical Exam  Constitutional:       Appearance: Normal appearance. He is well-developed and well-groomed. HENT:      Head: Normocephalic. Right Ear: Tympanic membrane and ear canal normal.      Left Ear: Tympanic membrane and ear canal normal.      Nose: Nose normal.      Mouth/Throat:      Mouth: Mucous membranes are moist.   Eyes:      Conjunctiva/sclera: Conjunctivae normal.      Pupils: Pupils are equal, round, and reactive to light. Neck:      Thyroid: No thyromegaly. Vascular: No carotid bruit or JVD. Cardiovascular:      Rate and Rhythm: Normal rate and regular rhythm. Heart sounds: Normal heart sounds. Pulmonary:      Effort: Pulmonary effort is normal. No respiratory distress. Breath sounds: Normal breath sounds. No wheezing. Abdominal:      General: Bowel sounds are normal.      Palpations: Abdomen is soft. Tenderness: There is no abdominal tenderness. Musculoskeletal:      Cervical back: Neck supple. Right lower leg: No edema. Left lower leg: No edema. Lymphadenopathy:      Cervical: No cervical adenopathy. Skin:     Capillary Refill: Capillary refill takes less than 2 seconds.    Neurological:      Mental Status: He is alert and oriented to person, place, and time. Psychiatric:         Mood and Affect: Mood normal.         Behavior: Behavior is cooperative. Please note that this chart was generated using voice recognition Dragon dictation software. Although every effort was made to ensure the accuracy of this automated transcription, some errors in transcription may have occurred.     Electronically signed by SUNITHA Mcfadden CNP on 10/16/2022

## 2022-10-16 ASSESSMENT — ENCOUNTER SYMPTOMS
ABDOMINAL PAIN: 0
CONSTIPATION: 0
NAUSEA: 0
WHEEZING: 0
COUGH: 0
EYES NEGATIVE: 1
DIARRHEA: 0
SHORTNESS OF BREATH: 0

## 2022-11-04 DIAGNOSIS — E78.2 MIXED HYPERLIPIDEMIA: ICD-10-CM

## 2022-11-04 RX ORDER — ATORVASTATIN CALCIUM 40 MG/1
TABLET, FILM COATED ORAL
Qty: 90 TABLET | Refills: 0 | Status: SHIPPED | OUTPATIENT
Start: 2022-11-04

## 2022-12-14 DIAGNOSIS — I10 ESSENTIAL HYPERTENSION: ICD-10-CM

## 2022-12-15 NOTE — TELEPHONE ENCOUNTER
Isi Rai is calling to request a refill on the following medication(s):  Requested Prescriptions     Pending Prescriptions Disp Refills    lisinopril (PRINIVIL;ZESTRIL) 5 MG tablet [Pharmacy Med Name: LISINOPRIL TAB 5MG] 90 tablet 3     Sig: TAKE 1 TABLET DAILY       Last Visit Date (If Applicable):  90/6/5782    Next Visit Date:    Visit date not found

## 2022-12-16 RX ORDER — LISINOPRIL 5 MG/1
TABLET ORAL
Qty: 90 TABLET | Refills: 3 | Status: SHIPPED | OUTPATIENT
Start: 2022-12-16

## 2023-01-18 DIAGNOSIS — E78.2 MIXED HYPERLIPIDEMIA: ICD-10-CM

## 2023-01-18 NOTE — TELEPHONE ENCOUNTER
Jeanette Chou is calling to request a refill on the following medication(s):  Requested Prescriptions     Pending Prescriptions Disp Refills    atorvastatin (LIPITOR) 40 MG tablet 90 tablet 3     Sig: Take 1 tablet by mouth daily       Last Visit Date (If Applicable):  03/1/4339    Next Visit Date:    Visit date not found

## 2023-01-20 RX ORDER — ATORVASTATIN CALCIUM 40 MG/1
40 TABLET, FILM COATED ORAL DAILY
Qty: 90 TABLET | Refills: 3 | Status: SHIPPED | OUTPATIENT
Start: 2023-01-20

## 2023-08-29 ENCOUNTER — OFFICE VISIT (OUTPATIENT)
Dept: FAMILY MEDICINE CLINIC | Age: 53
End: 2023-08-29
Payer: COMMERCIAL

## 2023-08-29 VITALS
DIASTOLIC BLOOD PRESSURE: 88 MMHG | OXYGEN SATURATION: 97 % | BODY MASS INDEX: 29.27 KG/M2 | SYSTOLIC BLOOD PRESSURE: 124 MMHG | HEART RATE: 89 BPM | WEIGHT: 215.8 LBS

## 2023-08-29 DIAGNOSIS — F41.9 ANXIETY: ICD-10-CM

## 2023-08-29 DIAGNOSIS — K21.9 GASTROESOPHAGEAL REFLUX DISEASE WITHOUT ESOPHAGITIS: ICD-10-CM

## 2023-08-29 DIAGNOSIS — E78.2 MIXED HYPERLIPIDEMIA: ICD-10-CM

## 2023-08-29 DIAGNOSIS — R55 SYNCOPE, UNSPECIFIED SYNCOPE TYPE: Primary | ICD-10-CM

## 2023-08-29 DIAGNOSIS — I10 ESSENTIAL HYPERTENSION: ICD-10-CM

## 2023-08-29 DIAGNOSIS — Z80.42 FAMILY HISTORY OF MALIGNANT NEOPLASM OF PROSTATE: ICD-10-CM

## 2023-08-29 DIAGNOSIS — Z12.5 SCREENING FOR MALIGNANT NEOPLASM OF PROSTATE: ICD-10-CM

## 2023-08-29 PROCEDURE — 3078F DIAST BP <80 MM HG: CPT | Performed by: NURSE PRACTITIONER

## 2023-08-29 PROCEDURE — 99214 OFFICE O/P EST MOD 30 MIN: CPT | Performed by: NURSE PRACTITIONER

## 2023-08-29 PROCEDURE — 3074F SYST BP LT 130 MM HG: CPT | Performed by: NURSE PRACTITIONER

## 2023-08-29 ASSESSMENT — PATIENT HEALTH QUESTIONNAIRE - PHQ9
SUM OF ALL RESPONSES TO PHQ QUESTIONS 1-9: 0
SUM OF ALL RESPONSES TO PHQ QUESTIONS 1-9: 0
SUM OF ALL RESPONSES TO PHQ9 QUESTIONS 1 & 2: 0
SUM OF ALL RESPONSES TO PHQ QUESTIONS 1-9: 0
SUM OF ALL RESPONSES TO PHQ QUESTIONS 1-9: 0
1. LITTLE INTEREST OR PLEASURE IN DOING THINGS: 0
2. FEELING DOWN, DEPRESSED OR HOPELESS: 0

## 2023-08-31 LAB
CHOLESTEROL/HDL RATIO: 3.4 RATIO (ref 0–4.5)
CHOLESTEROL: 214 MG/DL (ref 50–200)
HDLC SERPL-MCNC: 63 MG/DL (ref 36–68)
LDL CHOLESTEROL CALCULATED: 90.4 MG/DL (ref 0–160)
TRIGL SERPL-MCNC: 303 MG/DL (ref 10–250)
VLDLC SERPL CALC-MCNC: 60.6 MG/DL (ref 0–50)

## 2023-09-01 LAB
DIAGNOSTIC PSA: 0.79 NG/ML (ref 0–4)
TSH REFLEX FT4: 1.6 MIU/ML (ref 0.49–4.67)

## 2023-09-06 PROBLEM — R55 SYNCOPE: Status: ACTIVE | Noted: 2023-09-06

## 2023-09-06 PROBLEM — F41.9 ANXIETY: Status: ACTIVE | Noted: 2023-09-06

## 2023-09-06 ASSESSMENT — ENCOUNTER SYMPTOMS
WHEEZING: 0
NAUSEA: 0
ABDOMINAL PAIN: 0
COUGH: 0
BOWEL INCONTINENCE: 0
VOMITING: 0
DIARRHEA: 0
SHORTNESS OF BREATH: 0

## 2023-09-08 DIAGNOSIS — I10 ESSENTIAL HYPERTENSION: ICD-10-CM

## 2023-09-11 ENCOUNTER — TELEPHONE (OUTPATIENT)
Dept: FAMILY MEDICINE CLINIC | Age: 53
End: 2023-09-11

## 2023-09-11 NOTE — TELEPHONE ENCOUNTER
Called and left message with [facility] that the order is what is in system if not accepted then we can send pt elsewhere.  Awaiting response from [facility]

## 2023-09-11 NOTE — TELEPHONE ENCOUNTER
Navarro Segundo is calling to request a refill on the following medication(s):  Requested Prescriptions     Pending Prescriptions Disp Refills    lisinopril (PRINIVIL;ZESTRIL) 5 MG tablet [Pharmacy Med Name: LISINOPRIL TAB 5MG] 90 tablet 3     Sig: TAKE 1 TABLET DAILY       Last Visit Date (If Applicable):  9/70/7261    Next Visit Date:    Visit date not found

## 2023-09-11 NOTE — TELEPHONE ENCOUNTER
[address] [place] [address] RAD needs new echo order said the one with the Bubble is incorrect

## 2023-09-12 RX ORDER — LISINOPRIL 5 MG/1
TABLET ORAL
Qty: 90 TABLET | Refills: 3 | Status: SHIPPED | OUTPATIENT
Start: 2023-09-12

## 2023-11-13 ENCOUNTER — TELEPHONE (OUTPATIENT)
Dept: FAMILY MEDICINE CLINIC | Age: 53
End: 2023-11-13

## 2023-11-13 ENCOUNTER — TELEPHONE (OUTPATIENT)
Dept: SURGERY | Age: 53
End: 2023-11-13

## 2023-11-13 DIAGNOSIS — Z12.11 SCREENING FOR COLON CANCER: Primary | ICD-10-CM

## 2023-11-13 NOTE — TELEPHONE ENCOUNTER
----- Message from Cain Nguyen sent at 11/13/2023 10:48 AM EST -----  Subject: Referral Request    Reason for referral request? Pt needs referral for colonoscopy. Pt was   told to reach out to get this taken care of. Please reach out to pt to get   this doen for him. Provider patient wants to be referred to(if known):     Provider Phone Number(if known):     Additional Information for Provider?   ---------------------------------------------------------------------------  --------------  Donnell Makoti Brendan    [phone]; OK to leave message on voicemail  ---------------------------------------------------------------------------  --------------

## 2023-12-04 ENCOUNTER — TELEPHONE (OUTPATIENT)
Dept: SURGERY | Age: 53
End: 2023-12-04

## 2023-12-04 RX ORDER — POLYETHYLENE GLYCOL 3350, SODIUM CHLORIDE, SODIUM BICARBONATE, POTASSIUM CHLORIDE 420; 11.2; 5.72; 1.48 G/4L; G/4L; G/4L; G/4L
POWDER, FOR SOLUTION ORAL
Qty: 4000 ML | Refills: 0 | Status: SHIPPED | OUTPATIENT
Start: 2023-12-04

## 2023-12-04 RX ORDER — BISACODYL 5 MG/1
TABLET, DELAYED RELEASE ORAL
Qty: 2 TABLET | Refills: 0 | Status: SHIPPED | OUTPATIENT
Start: 2023-12-04

## 2023-12-04 NOTE — TELEPHONE ENCOUNTER
Spoke with patient at this time and scheduled a colonoscopy. Pre-surgery and bowel prep instructions went over with patient at this time and denies any questions. All medication holds reviewed and copy of instructions mailed to patient at this time. Acknowledges understanding of procedure and will call with any further questions.       Colonoscopy:                   Screening-Yes     Diagnostic-No     Surgery Date: 1/25/24    Location: [facility]    Pharmacy: [facility]

## 2023-12-04 NOTE — TELEPHONE ENCOUNTER
Patient returned call to [facility] to schedule colonoscopy. Patient understands Dr Joseph Alexandre no longer goes to [facility].      Please call back ph# [phone]

## 2023-12-13 DIAGNOSIS — E78.2 MIXED HYPERLIPIDEMIA: ICD-10-CM

## 2023-12-13 RX ORDER — ATORVASTATIN CALCIUM 40 MG/1
40 TABLET, FILM COATED ORAL NIGHTLY
Qty: 90 TABLET | Refills: 3 | Status: SHIPPED | OUTPATIENT
Start: 2023-12-13

## 2024-01-25 ENCOUNTER — HOSPITAL ENCOUNTER (OUTPATIENT)
Age: 54
Setting detail: OUTPATIENT SURGERY
Discharge: HOME | End: 2024-01-25
Attending: SURGERY | Admitting: SURGERY
Payer: COMMERCIAL

## 2024-01-25 ENCOUNTER — ANESTHESIA (OUTPATIENT)
Dept: OPERATING ROOM | Age: 54
End: 2024-01-25
Payer: COMMERCIAL

## 2024-01-25 ENCOUNTER — ANESTHESIA EVENT (OUTPATIENT)
Dept: OPERATING ROOM | Age: 54
End: 2024-01-25
Payer: COMMERCIAL

## 2024-01-25 VITALS
RESPIRATION RATE: 16 BRPM | HEART RATE: 71 BPM | BODY MASS INDEX: 28.1 KG/M2 | HEIGHT: 73 IN | WEIGHT: 212 LBS | SYSTOLIC BLOOD PRESSURE: 113 MMHG | OXYGEN SATURATION: 95 % | TEMPERATURE: 97.6 F | DIASTOLIC BLOOD PRESSURE: 70 MMHG

## 2024-01-25 DIAGNOSIS — Z12.11 COLON CANCER SCREENING: ICD-10-CM

## 2024-01-25 PROCEDURE — 3700000001 HC ADD 15 MINUTES (ANESTHESIA): Performed by: SURGERY

## 2024-01-25 PROCEDURE — 3609010400 HC COLONOSCOPY POLYPECTOMY HOT BIOPSY: Performed by: SURGERY

## 2024-01-25 PROCEDURE — 88305 TISSUE EXAM BY PATHOLOGIST: CPT

## 2024-01-25 PROCEDURE — 2580000003 HC RX 258: Performed by: SURGERY

## 2024-01-25 PROCEDURE — 3700000000 HC ANESTHESIA ATTENDED CARE: Performed by: SURGERY

## 2024-01-25 PROCEDURE — 7100000011 HC PHASE II RECOVERY - ADDTL 15 MIN: Performed by: SURGERY

## 2024-01-25 PROCEDURE — 6360000002 HC RX W HCPCS: Performed by: NURSE ANESTHETIST, CERTIFIED REGISTERED

## 2024-01-25 PROCEDURE — 2709999900 HC NON-CHARGEABLE SUPPLY: Performed by: SURGERY

## 2024-01-25 PROCEDURE — 7100000010 HC PHASE II RECOVERY - FIRST 15 MIN: Performed by: SURGERY

## 2024-01-25 RX ORDER — PROPOFOL 10 MG/ML
INJECTION, EMULSION INTRAVENOUS CONTINUOUS PRN
Status: DISCONTINUED | OUTPATIENT
Start: 2024-01-25 | End: 2024-01-25 | Stop reason: SDUPTHER

## 2024-01-25 RX ORDER — SODIUM CHLORIDE, SODIUM LACTATE, POTASSIUM CHLORIDE, CALCIUM CHLORIDE 600; 310; 30; 20 MG/100ML; MG/100ML; MG/100ML; MG/100ML
INJECTION, SOLUTION INTRAVENOUS CONTINUOUS
Status: DISCONTINUED | OUTPATIENT
Start: 2024-01-25 | End: 2024-01-25 | Stop reason: HOSPADM

## 2024-01-25 RX ORDER — SODIUM CHLORIDE 0.9 % (FLUSH) 0.9 %
5-40 SYRINGE (ML) INJECTION EVERY 12 HOURS SCHEDULED
Status: DISCONTINUED | OUTPATIENT
Start: 2024-01-25 | End: 2024-01-25 | Stop reason: HOSPADM

## 2024-01-25 RX ORDER — SODIUM CHLORIDE 9 MG/ML
INJECTION, SOLUTION INTRAVENOUS PRN
Status: DISCONTINUED | OUTPATIENT
Start: 2024-01-25 | End: 2024-01-25 | Stop reason: HOSPADM

## 2024-01-25 RX ORDER — SODIUM CHLORIDE 0.9 % (FLUSH) 0.9 %
5-40 SYRINGE (ML) INJECTION PRN
Status: DISCONTINUED | OUTPATIENT
Start: 2024-01-25 | End: 2024-01-25 | Stop reason: HOSPADM

## 2024-01-25 RX ADMIN — SODIUM CHLORIDE, POTASSIUM CHLORIDE, SODIUM LACTATE AND CALCIUM CHLORIDE: 600; 310; 30; 20 INJECTION, SOLUTION INTRAVENOUS at 08:41

## 2024-01-25 RX ADMIN — PROPOFOL 100 MG: 10 INJECTION, EMULSION INTRAVENOUS at 09:23

## 2024-01-25 RX ADMIN — PROPOFOL 180 MCG/KG/MIN: 10 INJECTION, EMULSION INTRAVENOUS at 09:24

## 2024-01-25 ASSESSMENT — PAIN - FUNCTIONAL ASSESSMENT
PAIN_FUNCTIONAL_ASSESSMENT: 0-10

## 2024-01-25 NOTE — ANESTHESIA PRE PROCEDURE
Department of Anesthesiology  Preprocedure Note       Name:  Herberth Peck   Age:  53 y.o.  :  1970                                          MRN:  [id number]         Date:  2024      Surgeon: Surgeon(s):  Mumtaz Harper DO    Procedure: Procedure(s):  COLONOSCOPY    Medications prior to admission:   Prior to Admission medications    Medication Sig Start Date End Date Taking? Authorizing Provider   atorvastatin (LIPITOR) 40 MG tablet TAKE 1 TABLET NIGHTLY 23   Kyara Aparicio APRN - CNP   bisacodyl 5 MG EC tablet Take two 5 mg tablets by mouth at 12:00 pm noon the day before your procedure. 23   Mumtaz Harper DO   polyethylene glycol-electrolytes (NULYTELY LEMON-LIME) 420 g solution Mix and take as directed. Beginning at 4:00 pm the day before your procedure, drink an 8 ounce glass every 10-15 minutes until gone. 23   Mumtaz Harper DO   lisinopril (PRINIVIL;ZESTRIL) 5 MG tablet TAKE 1 TABLET DAILY 23   Kyara Aparicio APRN - CNP   omeprazole (PRILOSEC) 20 MG capsule Take 1 capsule by mouth daily    Provider, MD Shilo       Current medications:    No current facility-administered medications for this encounter.       Allergies:  No Known Allergies    Problem List:    Patient Active Problem List   Diagnosis Code    Mixed hyperlipidemia E78.2    Family history of malignant neoplasm of prostate Z80.42    Gastroesophageal reflux disease without esophagitis K21.9    Essential hypertension I10    Anxiety F41.9    Syncope R55       Past Medical History:        Diagnosis Date    External hemorrhoid 3/2/2021    Gastroesophageal reflux disease without esophagitis     Hyperlipidemia        Past Surgical History:        Procedure Laterality Date    UPPER GASTROINTESTINAL ENDOSCOPY      Dr Stinson    UPPER GASTROINTESTINAL ENDOSCOPY      Dr Juice Allen biopsy negative; dilatation performed    UPPER GASTROINTESTINAL ENDOSCOPY  10/2015    Dr Rausch -negative

## 2024-01-25 NOTE — OP NOTE
[address] [address]                                OPERATIVE REPORT    PATIENT NAME: BRENDA MAC                  :        1970  MED REC NO:   [id number]                             ROOM:  ACCOUNT NO:   [id number]                           ADMIT DATE: 2024  PROVIDER:     Mumtaz Harper    DATE OF PROCEDURE:  2024    SURGEON:  Dr. Mumtaz Harper.    ASSISTANT:  None.    PREOPERATIVE DIAGNOSIS:  Screening.    POSTOPERATIVE DIAGNOSES:  1.  Screening.  2.  Colon polyp.    PROCEDURE:  Colonoscopy with hot snare polypectomy.    ANESTHESIA:  MAC.    ESTIMATED BLOOD LOSS:  Minimal.    FLUIDS:  Per anesthesia record.    COMPLICATIONS:  None.    SPECIMEN:  Polyp at 20 cm removed with hot snare.    INDICATION FOR PROCEDURE:  The patient is a 53-year-old gentleman  referred to my office for screening colonoscopy.  After evaluation,  decision was made to proceed.  Prior to the time of the procedure,  risks, benefits, and alternatives were explained to the patient and  consent was obtained.    DESCRIPTION OF PROCEDURE:  The patient was brought to endoscopy suite,  kept on preop gurney and placed in the left lateral decubitus position.   Monitoring devices were placed.  MAC anesthesia was induced.  After  induction of anesthesia, time-out was performed and correct patient and  procedure were verified.  Digital rectal exam was performed which showed  no abnormalities.  The Olympus video endoscope was lubricated, inserted  into the patient's rectum which was gently insufflated with air.  Scope  was slowly advanced through the colon under visualization to the level  of cecum which was identified by appendiceal orifice and ileocecal  valve.  During advancement of scope, the bowel prep was adequate.  Scope  was withdrawn through the colon with withdrawal time being greater than  7 minutes.  During this time, colonic

## 2024-01-25 NOTE — H&P
Subjective   Herberth Peck is a 53 y.o. male who presents today for screening colonoscopy.  No prior screening.  Denies any recent changes in bowel movements, blood per rectum, melena or unintended weight loss.  No report family hx of colon cancer.     Past Medical History:   Diagnosis Date    External hemorrhoid 3/2/2021    Gastroesophageal reflux disease without esophagitis     Hyperlipidemia        Past Surgical History:   Procedure Laterality Date    UPPER GASTROINTESTINAL ENDOSCOPY  1998    Dr Stinson    UPPER GASTROINTESTINAL ENDOSCOPY  1999    Dr Juice Allen biopsy negative; dilatation performed    UPPER GASTROINTESTINAL ENDOSCOPY  10/2015    Dr Rausch -negative    VASECTOMY  2004       No current facility-administered medications for this encounter.     Current Outpatient Medications   Medication Sig Dispense Refill    atorvastatin (LIPITOR) 40 MG tablet TAKE 1 TABLET NIGHTLY 90 tablet 3    bisacodyl 5 MG EC tablet Take two 5 mg tablets by mouth at 12:00 pm noon the day before your procedure. 2 tablet 0    polyethylene glycol-electrolytes (NULYTELY LEMON-LIME) 420 g solution Mix and take as directed. Beginning at 4:00 pm the day before your procedure, drink an 8 ounce glass every 10-15 minutes until gone. 4000 mL 0    lisinopril (PRINIVIL;ZESTRIL) 5 MG tablet TAKE 1 TABLET DAILY 90 tablet 3    omeprazole (PRILOSEC) 20 MG capsule Take 1 capsule by mouth daily         No Known Allergies    Family History   Problem Relation Age of Onset    High Blood Pressure Mother     Cancer Father     Prostate Cancer Father     No Known Problems Sister     No Known Problems Sister     No Known Problems Brother        Social History     Socioeconomic History    Marital status: Single     Spouse name: Not on file    Number of children: Not on file    Years of education: Not on file    Highest education level: Not on file   Occupational History    Not on file   Tobacco Use    Smoking status: Never    Smokeless tobacco:

## 2024-01-25 NOTE — DISCHARGE INSTRUCTIONS
Discharge Instructions for Colonoscopy     Colonoscopy is a visual exam of the lining of the large intestine, also called the bowel or colon, with a colonoscope. A colonoscope is a flexible tube with a light and a viewing device. It allows the doctor to view the inside of the colon through a tiny video camera.   Colonoscopy is performed for many reasons: unexplained anemia , pain, diarrhea , bloody stools, cancer screening, among many other reasons.   Complications from a colonoscopy are rare. Some possible serious complications include perforated bowel (which might require surgery) and bleeding (which could require blood transfusion ). Minor complications include bloating, gas, and cramping that can last for 1-2 days after the procedure.   Because air is put into your colon during the procedure, it is normal to pass large amounts of air from your rectum. You may not have a bowel movement for 1-3 days after the procedure.   What You Will Need   Someone to drive you home after the procedure    Steps to Take   Home Care    Rest when you get home.    Because the sedative will make you drowsy, don't drive, operate machinery, or make important decisions the day of the procedure.      Feelings of bloating, gas, or cramping may persist for 24 hours.   Diet    Try sips of water first. If tolerated, resume regular diet or the diet recommended by your physician.    Do not drink alcohol for 24 hours.    Physical Activity    Ask your doctor when you will be able to return to work.    Do not drive, operate heavy machinery, or do activities that require coordination or balance for 24 hours.    Otherwise, return to your normal routine as soon as you are comfortable to do so, which is usually the next day after the procedure.    Medications    When taking medications, it's important to:   Take your medication as directednot more, not less, not at a different time.   Do not stop taking them without consulting your healthcare  provider.   Don't share them with anyone else.   Know what effects and side effects to expect, and report them to your healthcare provider.   If you are taking more than one drug, even if it is an over-the-counter medication, herb, or dietary supplement, be sure to check with a physician or pharmacist about drug interactions.   Plan ahead for refills so you don't run out.   Lifestyle Changes    The results of your colonoscopy will determine if any lifestyle changes are necessary.   Follow-up   The doctor will usually give you a preliminary report after the medication wears off and you are more alert. The results from a biopsy can take as long as 1-2 weeks to be completed.   Schedule a follow-up appointment as directed by your doctor.    You should schedule a follow-up colonoscopy as recommended by your doctor.    Call Your Doctor If Any of the Following Occurs   Monitor your recovery once you leave the hospital. As soon as you have a problem, alert your doctor. If any of the following occur, call your doctor:   Bleeding from your rectum; notify your doctor if you pass a teaspoonful or more of blood   Black, tarry stools   Severe abdominal pain   Hard, swollen abdomen   Signs of infection, including fever or chills   Inability to pass gas or stool   Coughing, shortness of breath, chest pain, severe nausea or vomiting   In case of an emergency, call 911 immediately.

## 2024-01-25 NOTE — ANESTHESIA POSTPROCEDURE EVALUATION
Department of Anesthesiology  Postprocedure Note    Patient: Herberth Peck  MRN: [id number]  YOB: 1970  Date of evaluation: 1/25/2024    Procedure Summary       Date: 01/25/24 Room / Location: [facility]    Anesthesia Start: 0920 Anesthesia Stop: 0942    Procedure: COLONOSCOPY POLYPECTOMY HOT BIOPSY Diagnosis:       Colon cancer screening      (Colon cancer screening [Z12.11])    Surgeons: Mumtaz Harper DO Responsible Provider: Vern Shirley APRN - CRNA    Anesthesia Type: General, TIVA ASA Status: 2            Anesthesia Type: General, TIVA    Delmis Phase I: Delmis Score: 10    Delmis Phase II:      Anesthesia Post Evaluation    Patient location during evaluation: bedside  Level of consciousness: sleepy but conscious  Airway patency: patent  Nausea & Vomiting: no nausea and no vomiting  Cardiovascular status: hemodynamically stable  Respiratory status: spontaneous ventilation  Hydration status: stable  Pain management: satisfactory to patient    No notable events documented.

## 2024-01-27 LAB — SURGICAL PATHOLOGY REPORT: NORMAL

## 2024-01-31 PROBLEM — Z12.11 COLON CANCER SCREENING: Status: ACTIVE | Noted: 2024-01-31

## 2024-02-01 DIAGNOSIS — Z12.11 COLON CANCER SCREENING: Primary | ICD-10-CM

## 2024-03-01 PROBLEM — Z12.11 COLON CANCER SCREENING: Status: RESOLVED | Noted: 2024-01-31 | Resolved: 2024-03-01

## 2024-07-31 DIAGNOSIS — I10 ESSENTIAL HYPERTENSION: ICD-10-CM

## 2024-07-31 RX ORDER — LISINOPRIL 5 MG/1
TABLET ORAL
Qty: 90 TABLET | Refills: 0 | Status: SHIPPED | OUTPATIENT
Start: 2024-07-31 | End: 2024-08-20 | Stop reason: SDUPTHER

## 2024-07-31 NOTE — TELEPHONE ENCOUNTER
Herberth Peck is calling to request a refill on the following medication(s):  Requested Prescriptions     Pending Prescriptions Disp Refills    lisinopril (PRINIVIL;ZESTRIL) 5 MG tablet [Pharmacy Med Name: LISINOPRIL TAB 5MG] 90 tablet 0     Sig: TAKE 1 TABLET DAILY       Last Visit Date (If Applicable):  8/29/2023    Next Visit Date:    Visit date not found

## 2024-08-20 ENCOUNTER — OFFICE VISIT (OUTPATIENT)
Dept: FAMILY MEDICINE CLINIC | Age: 54
End: 2024-08-20
Payer: COMMERCIAL

## 2024-08-20 VITALS
HEART RATE: 87 BPM | OXYGEN SATURATION: 98 % | DIASTOLIC BLOOD PRESSURE: 78 MMHG | WEIGHT: 219.4 LBS | SYSTOLIC BLOOD PRESSURE: 122 MMHG | BODY MASS INDEX: 28.95 KG/M2

## 2024-08-20 DIAGNOSIS — F41.9 ANXIETY: ICD-10-CM

## 2024-08-20 DIAGNOSIS — Z00.00 ENCOUNTER FOR WELLNESS EXAMINATION IN ADULT: Primary | ICD-10-CM

## 2024-08-20 DIAGNOSIS — I10 ESSENTIAL HYPERTENSION: ICD-10-CM

## 2024-08-20 DIAGNOSIS — K21.9 GASTROESOPHAGEAL REFLUX DISEASE WITHOUT ESOPHAGITIS: ICD-10-CM

## 2024-08-20 DIAGNOSIS — E78.2 MIXED HYPERLIPIDEMIA: ICD-10-CM

## 2024-08-20 PROCEDURE — 3074F SYST BP LT 130 MM HG: CPT | Performed by: NURSE PRACTITIONER

## 2024-08-20 PROCEDURE — 99396 PREV VISIT EST AGE 40-64: CPT | Performed by: NURSE PRACTITIONER

## 2024-08-20 PROCEDURE — 3078F DIAST BP <80 MM HG: CPT | Performed by: NURSE PRACTITIONER

## 2024-08-20 RX ORDER — ATORVASTATIN CALCIUM 40 MG/1
40 TABLET, FILM COATED ORAL NIGHTLY
Qty: 90 TABLET | Refills: 3 | Status: SHIPPED | OUTPATIENT
Start: 2024-08-20

## 2024-08-20 RX ORDER — LISINOPRIL 5 MG/1
5 TABLET ORAL DAILY
Qty: 90 TABLET | Refills: 3 | Status: SHIPPED | OUTPATIENT
Start: 2024-08-20

## 2024-08-20 SDOH — ECONOMIC STABILITY: FOOD INSECURITY: WITHIN THE PAST 12 MONTHS, YOU WORRIED THAT YOUR FOOD WOULD RUN OUT BEFORE YOU GOT MONEY TO BUY MORE.: NEVER TRUE

## 2024-08-20 SDOH — ECONOMIC STABILITY: FOOD INSECURITY: WITHIN THE PAST 12 MONTHS, THE FOOD YOU BOUGHT JUST DIDN'T LAST AND YOU DIDN'T HAVE MONEY TO GET MORE.: NEVER TRUE

## 2024-08-20 SDOH — ECONOMIC STABILITY: INCOME INSECURITY: HOW HARD IS IT FOR YOU TO PAY FOR THE VERY BASICS LIKE FOOD, HOUSING, MEDICAL CARE, AND HEATING?: NOT HARD AT ALL

## 2024-08-20 ASSESSMENT — PATIENT HEALTH QUESTIONNAIRE - PHQ9
SUM OF ALL RESPONSES TO PHQ QUESTIONS 1-9: 0
1. LITTLE INTEREST OR PLEASURE IN DOING THINGS: NOT AT ALL
SUM OF ALL RESPONSES TO PHQ QUESTIONS 1-9: 0
SUM OF ALL RESPONSES TO PHQ QUESTIONS 1-9: 0
2. FEELING DOWN, DEPRESSED OR HOPELESS: NOT AT ALL
SUM OF ALL RESPONSES TO PHQ QUESTIONS 1-9: 0
SUM OF ALL RESPONSES TO PHQ9 QUESTIONS 1 & 2: 0

## 2024-08-20 NOTE — PROGRESS NOTES
Negative for polydipsia, polyphagia and polyuria.   Genitourinary: Negative.    Neurological:  Negative for dizziness and headaches.   Psychiatric/Behavioral:  Negative for agitation, decreased concentration and dysphoric mood. The patient is not nervous/anxious.             8/20/2024    12:36 PM 8/29/2023     1:41 PM 4/4/2022     3:13 PM 3/2/2021    12:41 PM 1/17/2020    11:12 AM 9/26/2018     9:19 AM 6/27/2018     5:41 PM   PHQ Scores   PHQ2 Score 0 0 0 0 0 0 0   PHQ9 Score 0 0 0 0 0 0 0     Interpretation of Total Score Depression Severity: 1-4 = Minimal depression, 5-9 = Mild depression, 10-14 = Moderate depression, 15-19 = Moderately severe depression, 20-27 = Severe depression     Objective:     Vitals:    08/20/24 1238 08/20/24 1250   BP: (!) 144/94 122/78   Pulse: 87    SpO2: 98%    Weight: 99.5 kg (219 lb 6.4 oz)       Estimated body mass index is 28.95 kg/m² as calculated from the following:    Height as of 1/25/24: 1.854 m (6' 1\").    Weight as of this encounter: 99.5 kg (219 lb 6.4 oz).     Physical Exam  Constitutional:       Appearance: Normal appearance. He is well-developed and well-groomed.   HENT:      Head: Normocephalic.   Eyes:      Conjunctiva/sclera: Conjunctivae normal.   Neck:      Thyroid: No thyromegaly.   Cardiovascular:      Rate and Rhythm: Normal rate and regular rhythm.      Heart sounds: Normal heart sounds.   Pulmonary:      Effort: Pulmonary effort is normal.      Breath sounds: Normal breath sounds. No wheezing.   Musculoskeletal:      Cervical back: Neck supple.      Right lower leg: No edema.      Left lower leg: No edema.   Skin:     Capillary Refill: Capillary refill takes less than 2 seconds.   Neurological:      Mental Status: He is alert and oriented to person, place, and time.      Gait: Gait normal.   Psychiatric:         Mood and Affect: Mood normal.         Behavior: Behavior is cooperative.       Please note that this chart was generated using voice recognition Dragon

## 2024-09-02 ASSESSMENT — ENCOUNTER SYMPTOMS
SHORTNESS OF BREATH: 0
COUGH: 0
ABDOMINAL PAIN: 0
WHEEZING: 0
NAUSEA: 0
DIARRHEA: 0
CONSTIPATION: 0

## 2024-09-11 LAB
ALBUMIN/GLOBULIN RATIO: 1.8 G/DL
ALBUMIN: 4.6 G/DL (ref 3.5–5)
ALP BLD-CCNC: 107 UNITS/L (ref 38–126)
ALT SERPL-CCNC: 65 UNITS/L (ref 4–50)
ANION GAP SERPL CALCULATED.3IONS-SCNC: 9.9 MMOL/L (ref 3–11)
AST SERPL-CCNC: 58 UNITS/L (ref 17–59)
BASOPHILS ABSOLUTE: 0.05 X10E3/?L (ref 0–0.3)
BASOPHILS RELATIVE PERCENT: 0.78 % (ref 0–3)
BILIRUB SERPL-MCNC: 1 MG/DL (ref 0.2–1.3)
BUN BLDV-MCNC: 13 MG/DL (ref 9–20)
CALCIUM SERPL-MCNC: 9.7 MG/DL (ref 8.4–10.2)
CHLORIDE BLD-SCNC: 103 MMOL/L (ref 98–120)
CHOLESTEROL, TOTAL: 230 MG/DL (ref 50–200)
CHOLESTEROL/HDL RATIO: 3.19 RATIO (ref 0–4.5)
CO2: 24 MMOL/L (ref 22–31)
CREAT SERPL-MCNC: 1.1 MG/DL (ref 0.7–1.3)
CREATININE, RANDOM URINE: 142.9 MG/DL (ref 20–370)
EOSINOPHILS ABSOLUTE: 0.12 X10E3/?L (ref 0–1.1)
EOSINOPHILS RELATIVE PERCENT: 2 % (ref 0–10)
GFR, ESTIMATED: > 60
GLOBULIN: 2.6 G/DL
GLUCOSE: 103 MG/DL (ref 75–110)
HCT VFR BLD CALC: 47.9 % (ref 42–52)
HDLC SERPL-MCNC: 72 MG/DL (ref 36–68)
HEMOGLOBIN: 15.7 G/DL (ref 13.8–17.8)
LDL CHOLESTEROL: 119 MG/DL (ref 0–160)
LYMPHOCYTES ABSOLUTE: 1.4 X10E3/?L (ref 1–5.5)
LYMPHOCYTES RELATIVE PERCENT: 22.59 % (ref 20–51.1)
MCH RBC QN AUTO: 30.6 PG (ref 28.5–32.5)
MCHC RBC AUTO-ENTMCNC: 32.7 G/DL (ref 32–37)
MCV RBC AUTO: 93.6 FL (ref 80–94)
MICROALBUMIN/CREAT 24H UR: < 0.6 MG/DL (ref 0–1.7)
MONOCYTES ABSOLUTE: 0.69 X10E3/?L (ref 0.1–1)
MONOCYTES RELATIVE PERCENT: 11.1 % (ref 1.7–9.3)
NEUTROPHILS ABSOLUTE: 3.92 X10E3/?L (ref 2–8.1)
NEUTROPHILS RELATIVE PERCENT: 63.53 % (ref 42.2–75.2)
PDW BLD-RTO: 11.8 % (ref 10–15.5)
PLATELET # BLD: 211.1 THOU/MM3 (ref 130–400)
POTASSIUM SERPL-SCNC: 4 MMOL/L (ref 3.6–5)
RBC # BLD: 5.12 M/UL (ref 4.7–6.1)
SODIUM BLD-SCNC: 137 MMOL/L (ref 135–145)
TOTAL PROTEIN: 7.1 G/DL (ref 6.3–8.2)
TRIGL SERPL-MCNC: 195 MG/DL (ref 10–250)
VLDLC SERPL CALC-MCNC: 39 MG/DL (ref 0–50)
WBC # BLD: 6.2 THOU/ML3 (ref 4.8–10.8)

## 2025-03-24 ENCOUNTER — OFFICE VISIT (OUTPATIENT)
Dept: FAMILY MEDICINE CLINIC | Age: 55
End: 2025-03-24
Payer: COMMERCIAL

## 2025-03-24 VITALS
OXYGEN SATURATION: 98 % | BODY MASS INDEX: 28.95 KG/M2 | DIASTOLIC BLOOD PRESSURE: 80 MMHG | HEIGHT: 73 IN | SYSTOLIC BLOOD PRESSURE: 140 MMHG | HEART RATE: 88 BPM

## 2025-03-24 DIAGNOSIS — I10 ESSENTIAL HYPERTENSION: Primary | ICD-10-CM

## 2025-03-24 PROCEDURE — 3077F SYST BP >= 140 MM HG: CPT | Performed by: NURSE PRACTITIONER

## 2025-03-24 PROCEDURE — 99213 OFFICE O/P EST LOW 20 MIN: CPT | Performed by: NURSE PRACTITIONER

## 2025-03-24 PROCEDURE — 3079F DIAST BP 80-89 MM HG: CPT | Performed by: NURSE PRACTITIONER

## 2025-03-24 RX ORDER — LISINOPRIL 20 MG/1
20 TABLET ORAL DAILY
Qty: 90 TABLET | Refills: 0
Start: 2025-03-24

## 2025-03-24 SDOH — ECONOMIC STABILITY: FOOD INSECURITY: WITHIN THE PAST 12 MONTHS, THE FOOD YOU BOUGHT JUST DIDN'T LAST AND YOU DIDN'T HAVE MONEY TO GET MORE.: NEVER TRUE

## 2025-03-24 SDOH — ECONOMIC STABILITY: FOOD INSECURITY: WITHIN THE PAST 12 MONTHS, YOU WORRIED THAT YOUR FOOD WOULD RUN OUT BEFORE YOU GOT MONEY TO BUY MORE.: NEVER TRUE

## 2025-03-24 ASSESSMENT — PATIENT HEALTH QUESTIONNAIRE - PHQ9
SUM OF ALL RESPONSES TO PHQ QUESTIONS 1-9: 0
1. LITTLE INTEREST OR PLEASURE IN DOING THINGS: NOT AT ALL
2. FEELING DOWN, DEPRESSED OR HOPELESS: NOT AT ALL
SUM OF ALL RESPONSES TO PHQ QUESTIONS 1-9: 0

## 2025-03-24 ASSESSMENT — ENCOUNTER SYMPTOMS
SHORTNESS OF BREATH: 0
WHEEZING: 0
COUGH: 0

## 2025-03-24 NOTE — PROGRESS NOTES
[address], [address]  [place] [id number]  Dept: [phone]  Dept Fax: [phone]    Date of Service:  3/24/2025    Herberth Peck is a 54 y.o. male who presents today for his medical conditions/complaints as noted below.      Chief Complaint   Patient presents with    Hypertension     BP High over the weekend, has readings to review, has had slight headache and felt more fatigued.      DIAGNOSIS / PLAN:     Assessment & Plan  1. Hypertension: Stable. Urine test for protein was normal in 09/2024.  - Increase lisinopril dosage to 20 mg daily.  - Monitor blood pressure regularly but not obsessively, ensuring rest prior to readings.  - Recheck blood pressure before leaving the clinic today.  - Maintain high water intake and continue to avoid adding salt to food.  - Assisted in setting up MyChart.    Follow-up  - Follow up in 4 to 6 weeks for a blood pressure check.     The encounter diagnosis was Essential hypertension.     Discussed the purpose, benefits, and potential side effects of the prescribed medications in detail. Addressed all of the patient's questions, and the patient expressed clear understanding. The patient agreed with the proposed treatment plan. Follow-up scheduled as directed.    Return in about 4 weeks (around 4/21/2025).    SUBJECTIVE:     History of Present Illness  The patient presents for evaluation of elevated blood pressure.     Blood pressure readings have been consistently high, with values of 150/102, 138/86, and 158/98. A single low reading of 86/84 was recorded approximately 15 minutes after a high reading, raising concerns about the accuracy of the wrist blood pressure monitor used. Headaches and a general feeling of malaise have been reported, which may be related to the elevated blood pressure.     They are currently on lisinopril 5 mg and recently refilled a 90-day supply. They maintain a high water intake and do not add

## 2025-04-28 ENCOUNTER — OFFICE VISIT (OUTPATIENT)
Dept: FAMILY MEDICINE CLINIC | Age: 55
End: 2025-04-28
Payer: COMMERCIAL

## 2025-04-28 VITALS
BODY MASS INDEX: 29.42 KG/M2 | SYSTOLIC BLOOD PRESSURE: 132 MMHG | OXYGEN SATURATION: 98 % | WEIGHT: 223 LBS | HEART RATE: 80 BPM | DIASTOLIC BLOOD PRESSURE: 82 MMHG

## 2025-04-28 DIAGNOSIS — E78.2 MIXED HYPERLIPIDEMIA: ICD-10-CM

## 2025-04-28 DIAGNOSIS — I10 ESSENTIAL HYPERTENSION: Primary | ICD-10-CM

## 2025-04-28 PROCEDURE — 3075F SYST BP GE 130 - 139MM HG: CPT | Performed by: NURSE PRACTITIONER

## 2025-04-28 PROCEDURE — 99214 OFFICE O/P EST MOD 30 MIN: CPT | Performed by: NURSE PRACTITIONER

## 2025-04-28 PROCEDURE — 3079F DIAST BP 80-89 MM HG: CPT | Performed by: NURSE PRACTITIONER

## 2025-04-28 RX ORDER — LISINOPRIL 20 MG/1
20 TABLET ORAL DAILY
Qty: 90 TABLET | Refills: 1 | Status: SHIPPED | OUTPATIENT
Start: 2025-04-28

## 2025-04-28 NOTE — PROGRESS NOTES
[place] [address] [facility], [address]  [place] [address]  Dept: [phone]  Dept Fax: [phone]    Date of Service:  4/28/2025    Herberth Peck is a 54 y.o. male who presents today for his medical conditions/complaints as noted below.      Chief Complaint   Patient presents with    Medication Check     HTN      DIAGNOSIS / PLAN:     1. Essential hypertension  Assessment & Plan:  - Blood pressure readings have shown significant improvement, with today's reading being 132/82.  - Slight weight gain noted, but blood pressure remains well-controlled.  - Prescription refill for lisinopril 20 mg provided, with a 90-day supply to be filled at [facility].  - Laboratory orders placed, including a urine test for microalbumin to monitor kidney function.  - Advised to maintain an active lifestyle and ensure adequate hydration.  - Instructed to continue monitoring blood pressure at home, especially during episodes of headache or general malaise.  Orders:  -     lisinopril (PRINIVIL;ZESTRIL) 20 MG tablet; Take 1 tablet by mouth daily, Disp-90 tablet, R-1Patient needs appointment for further refillsNormal  -     Comprehensive Metabolic Panel; Future  -     Albumin/Creatinine Ratio, Urine; Future  -     Lipid, Fasting; Future  2. Mixed hyperlipidemia  -     Comprehensive Metabolic Panel; Future  -     Lipid, Fasting; Future       Hives:  - Reported experiencing hives, believed to be stress-related, which resolved after discussing financial concerns with their advisor.  - Treated with lotion and green aloe, which alleviated the itching.  - Advised to monitor for any recurrence of the hives.  - If hives reappear, inform the clinic immediately for potential medication adjustment.    Follow-up  - Complete lab orders prior to the next visit.    Discussed the purpose, benefits, and potential side effects of the prescribed medications in detail. Addressed all of the patient's questions, and the

## 2025-05-09 NOTE — ASSESSMENT & PLAN NOTE
- Blood pressure readings have shown significant improvement, with today's reading being 132/82.  - Slight weight gain noted, but blood pressure remains well-controlled.  - Prescription refill for lisinopril 20 mg provided, with a 90-day supply to be filled at [facility].  - Laboratory orders placed, including a urine test for microalbumin to monitor kidney function.  - Advised to maintain an active lifestyle and ensure adequate hydration.  - Instructed to continue monitoring blood pressure at home, especially during episodes of headache or general malaise.

## 2025-07-17 LAB
ALBUMIN/GLOBULIN RATIO: 1.7 G/DL
ALBUMIN: 4.1 G/DL (ref 3.5–5)
ALP BLD-CCNC: 97 UNITS/L (ref 38–126)
ALT SERPL-CCNC: 49 UNITS/L (ref 4–50)
ANION GAP SERPL CALCULATED.3IONS-SCNC: 7.4 MMOL/L (ref 3–11)
AST SERPL-CCNC: 39 UNITS/L (ref 17–59)
BILIRUB SERPL-MCNC: 0.5 MG/DL (ref 0.2–1.3)
BUN BLDV-MCNC: 9 MG/DL (ref 9–20)
CALCIUM SERPL-MCNC: 9.4 MG/DL (ref 8.4–10.2)
CHLORIDE BLD-SCNC: 104 MMOL/L (ref 98–120)
CHOLESTEROL, TOTAL: 177 MG/DL (ref 50–200)
CHOLESTEROL/HDL RATIO: 3 RATIO (ref 0–4.5)
CO2: 26 MMOL/L (ref 22–31)
CREAT SERPL-MCNC: 1.1 MG/DL (ref 0.7–1.3)
CREATININE, RANDOM URINE: 206.2 MG/DL (ref 20–370)
GFR, ESTIMATED: > 60
GLOBULIN: 2.5 G/DL
GLUCOSE: 89 MG/DL (ref 75–110)
HDLC SERPL-MCNC: 67 MG/DL (ref 36–68)
LDL CHOLESTEROL: 80.4 MG/DL (ref 0–160)
MICROALBUMIN/CREAT 24H UR: 0.7 MG/DL (ref 0–1.7)
MICROALBUMIN/CREAT UR-RTO: 3.39
POTASSIUM SERPL-SCNC: 4 MMOL/L (ref 3.6–5)
SODIUM BLD-SCNC: 138 MMOL/L (ref 135–145)
TOTAL PROTEIN: 6.7 G/DL (ref 6.3–8.2)
TRIGL SERPL-MCNC: 148 MG/DL (ref 10–250)
VLDLC SERPL CALC-MCNC: 30 MG/DL (ref 0–50)

## 2025-07-28 ENCOUNTER — OFFICE VISIT (OUTPATIENT)
Dept: FAMILY MEDICINE CLINIC | Age: 55
End: 2025-07-28
Payer: COMMERCIAL

## 2025-07-28 VITALS
WEIGHT: 216 LBS | HEART RATE: 84 BPM | OXYGEN SATURATION: 95 % | BODY MASS INDEX: 28.5 KG/M2 | SYSTOLIC BLOOD PRESSURE: 126 MMHG | DIASTOLIC BLOOD PRESSURE: 80 MMHG

## 2025-07-28 DIAGNOSIS — I10 ESSENTIAL HYPERTENSION: Primary | ICD-10-CM

## 2025-07-28 DIAGNOSIS — R20.2 NUMBNESS AND TINGLING OF RIGHT LEG: ICD-10-CM

## 2025-07-28 DIAGNOSIS — F41.9 ANXIETY: ICD-10-CM

## 2025-07-28 DIAGNOSIS — E78.2 MIXED HYPERLIPIDEMIA: ICD-10-CM

## 2025-07-28 DIAGNOSIS — R20.0 NUMBNESS AND TINGLING OF RIGHT LEG: ICD-10-CM

## 2025-07-28 DIAGNOSIS — B35.1 ONYCHOMYCOSIS: ICD-10-CM

## 2025-07-28 DIAGNOSIS — K21.9 GASTROESOPHAGEAL REFLUX DISEASE WITHOUT ESOPHAGITIS: ICD-10-CM

## 2025-07-28 PROBLEM — R55 SYNCOPE: Status: RESOLVED | Noted: 2023-09-06 | Resolved: 2025-07-28

## 2025-07-28 PROCEDURE — 99214 OFFICE O/P EST MOD 30 MIN: CPT | Performed by: NURSE PRACTITIONER

## 2025-07-28 PROCEDURE — 3074F SYST BP LT 130 MM HG: CPT | Performed by: NURSE PRACTITIONER

## 2025-07-28 PROCEDURE — 3079F DIAST BP 80-89 MM HG: CPT | Performed by: NURSE PRACTITIONER

## 2025-07-28 NOTE — ASSESSMENT & PLAN NOTE
Discussed options for treatment,  including mediations and otc recommendations. Patient opted to try otc option first.

## 2025-07-28 NOTE — PROGRESS NOTES
[address], [address]  [place] [address]  Dept: [phone]  Dept Fax: [phone]    Date of Service:  7/28/2025    Herberth Peck is a 55 y.o. male who presents today for his medical conditions/complaints as noted below.      Chief Complaint   Patient presents with    3 Month Follow-Up     HTN, med f/u    Skin Problem     Hives on neck    Leg Pain     Tingling, numbness and burning in right thigh    Nail Problem     Both big toes fungus under the nail      DIAGNOSIS / PLAN:     Assessment & Plan  1. Anxiety:   - Practice deep breathing exercises and engage in physical activities such as walking.  - Discussed the impact of stress and anxiety on physical symptoms.    2. Hives:   - Monitor for any new products that might be causing the reaction.  - Discussed the role of stress in exacerbating hives.    3. Numbness and tingling in the right thigh:   - Consider chiropractic treatment and perform stretching exercises for the lower back.    4. Spot on the head:   - Continue avoiding wearing a hat and monitor the spot.  - Discussed the potential fungal or sweat-related cause.    Health maintenance:   - Blood pressure is well-controlled at 126/80 mmHg.  - Cholesterol levels have improved, and all other lab results are within normal limits.  - PSA level was last checked in 2023 and was normal at 0.79 ng/mL.  - Maintain a BMI around 25, which would require weighing under 200 pounds.  - Continue current medication regimen of lisinopril, atorvastatin, and omeprazole.    Follow-up  - Yearly wellness visit recommended.       Essential hypertension  Assessment & Plan:   Chronic, at goal (stable), continue current treatment plan  Mixed hyperlipidemia  Assessment & Plan:   Chronic, at goal (stable), continue current treatment plan. Ding well with statin.  Gastroesophageal reflux disease without esophagitis  Assessment & Plan:   Chronic, at goal (stable), continue current

## (undated) DEVICE — SNARE ENDOSCP M W27MMXL240CM SHTH DIA2.4MM OVL FLX DISP

## (undated) DEVICE — 9165 UNIVERSAL PATIENT PLATE: Brand: 3M™

## (undated) DEVICE — TRAP SURG QUAD PARABOLA SLOT DSGN SFTY SCRN TRAPEASE

## (undated) DEVICE — MERCY DEFIANCE ENDO KIT: Brand: MEDLINE INDUSTRIES, INC.

## (undated) DEVICE — CO2 CANNULA,SSOFT,ADLT,7O2,4CO2,FEMALE: Brand: MEDLINE

## (undated) DEVICE — 4-PORT MANIFOLD: Brand: NEPTUNE 2